# Patient Record
Sex: MALE | Race: BLACK OR AFRICAN AMERICAN | NOT HISPANIC OR LATINO | Employment: FULL TIME | URBAN - METROPOLITAN AREA
[De-identification: names, ages, dates, MRNs, and addresses within clinical notes are randomized per-mention and may not be internally consistent; named-entity substitution may affect disease eponyms.]

---

## 2017-01-04 ENCOUNTER — HOSPITAL ENCOUNTER (EMERGENCY)
Facility: CLINIC | Age: 52
Discharge: HOME | End: 2017-01-04
Attending: EMERGENCY MEDICINE | Admitting: EMERGENCY MEDICINE
Payer: COMMERCIAL

## 2017-01-04 VITALS
OXYGEN SATURATION: 99 % | TEMPERATURE: 98 F | BODY MASS INDEX: 37.56 KG/M2 | WEIGHT: 220 LBS | HEIGHT: 64 IN | DIASTOLIC BLOOD PRESSURE: 72 MMHG | HEART RATE: 87 BPM | RESPIRATION RATE: 18 BRPM | SYSTOLIC BLOOD PRESSURE: 119 MMHG

## 2017-01-04 DIAGNOSIS — L03.011 PARONYCHIA OF RIGHT MIDDLE FINGER: ICD-10-CM

## 2017-01-04 DIAGNOSIS — L03.011 CELLULITIS OF FINGER OF RIGHT HAND: ICD-10-CM

## 2017-01-04 PROCEDURE — 10060 I&D ABSCESS SIMPLE/SINGLE: CPT

## 2017-01-04 PROCEDURE — 76882 US LMTD JT/FCL EVL NVASC XTR: CPT

## 2017-01-04 PROCEDURE — 25000132 ZZH RX MED GY IP 250 OP 250 PS 637: Performed by: EMERGENCY MEDICINE

## 2017-01-04 PROCEDURE — 99284 EMERGENCY DEPT VISIT MOD MDM: CPT | Mod: 25

## 2017-01-04 RX ORDER — HYDROCODONE BITARTRATE AND ACETAMINOPHEN 5; 325 MG/1; MG/1
2 TABLET ORAL ONCE
Status: COMPLETED | OUTPATIENT
Start: 2017-01-04 | End: 2017-01-04

## 2017-01-04 RX ORDER — MULTIPLE VITAMINS W/ MINERALS TAB 9MG-400MCG
1 TAB ORAL DAILY
COMMUNITY
End: 2023-04-11

## 2017-01-04 RX ORDER — CEPHALEXIN 500 MG/1
500 CAPSULE ORAL 4 TIMES DAILY
Qty: 28 CAPSULE | Refills: 0 | Status: SHIPPED | OUTPATIENT
Start: 2017-01-04 | End: 2017-01-11

## 2017-01-04 RX ORDER — LIDOCAINE 40 MG/G
CREAM TOPICAL ONCE
Status: DISCONTINUED | OUTPATIENT
Start: 2017-01-04 | End: 2017-01-04 | Stop reason: HOSPADM

## 2017-01-04 RX ORDER — BACITRACIN ZINC 500 [USP'U]/G
OINTMENT TOPICAL
Status: DISCONTINUED
Start: 2017-01-04 | End: 2017-01-04 | Stop reason: HOSPADM

## 2017-01-04 RX ORDER — HYDROCODONE BITARTRATE AND ACETAMINOPHEN 5; 325 MG/1; MG/1
1-2 TABLET ORAL EVERY 4 HOURS PRN
Qty: 6 TABLET | Refills: 0 | Status: SHIPPED | OUTPATIENT
Start: 2017-01-04 | End: 2021-06-24

## 2017-01-04 RX ADMIN — HYDROCODONE BITARTRATE AND ACETAMINOPHEN 2 TABLET: 5; 325 TABLET ORAL at 18:24

## 2017-01-04 NOTE — ED AVS SNAPSHOT
[facility]    [address]    [facility] [address]    Phone:  [phone]    Fax:  [phone]                                       Vaibhav Vaca   MRN: [id number]    Department:  [facility]   Date of Visit:  1/4/2017           Patient Information     Date Of Birth          1965        Your diagnoses for this visit were:     Paronychia of right middle finger     Cellulitis of finger of right hand        You were seen by Jinny Menendez MD.      Follow-up Information     Follow up with Primary care In 2 days.    Why:  For wound re-check        Follow up with [facility].    Specialty:  EMERGENCY MEDICINE    Why:  If symptoms worsen    Contact information:    [address]  [facility] [address]  [phone]        Discharge Instructions       Soak the finger in warm water for 15 min 2x per day for 2 days.        Paronychia of the Finger or Toe  Paronychia is an infection near a fingernail or toenail. It usually occurs when an opening in the cuticle or an ingrown toenail lets bacteria under the skin.  The infection will need to be drained if pus is present. If the infection has been caught early, you may need only antibiotic treatment. Healing will take about 1 to 2 weeks.  Home care  Follow these guidelines when caring for yourself at home:    Clean and soak the toe or finger. Do this twice a day for the first 3 days. To do so:    Soak your foot or hand in a tub of warm water for 5 minutes. Or hold your toe or finger under a faucet of warm running water for 5 minutes.    Clean any crust away with soap and water using a cotton swab.    Put antibiotic ointment on the infected area.    Change the dressing daily or any time it becomes soiled.    If you were given antibiotics, take them as directed until they are all gone.    If your infection is on a toe, wear  comfortable shoes with a lot of toe room. You can also wear open-toed sandals while your toe heals.    You may use acetaminophen or ibuprofen to help with pain, unless another medicine was prescribed. If you have chronic liver or kidney disease, talk with your health care provider before using these medicines. Also talk with your provider if you've had a stomach ulcer or GI bleeding.  Prevention  The following can prevent paronychia:    Trim or push down the skin around the nail (cuticle).    Don't bite your nails.    Don't suck on your thumbs or fingers.  Follow-up care  Follow up with your health care provider, or as advised.  When to seek medical advice  Call your health care provider right away if any of these occur:    Redness, pain, or swelling of the finger or toe gets worse    Red streaks in the skin leading away from the wound    Pus or fluid drain from the nail area    Fever of 100.4 F (38 C) or higher, or as directed by your health care provider    0703-4329 [employer]. [address] [place] [address]. All rights reserved. This information is not intended as a substitute for professional medical care. Always follow your healthcare professional's instructions.          24 Hour Appointment Hotline       To make an appointment at any [facility] clinic, call [phone] ([phone]). If you don't have a family doctor or clinic, we will help you find one. [facility] clinics are conveniently located to serve the needs of you and your family.             Review of your medicines      START taking        Dose / Directions Last dose taken    cephALEXin 500 MG capsule   Commonly known as:  KEFLEX   Dose:  500 mg   Quantity:  28 capsule        Take 1 capsule (500 mg) by mouth 4 times daily for 7 days   Refills:  0        HYDROcodone-acetaminophen 5-325 MG per tablet   Commonly known as:  NORCO   Dose:  1-2 tablet   Quantity:  6 tablet        Take 1-2 tablets by mouth every 4 hours as  needed for moderate to severe pain   Refills:  0          Our records show that you are taking the medicines listed below. If these are incorrect, please call your family doctor or clinic.        Dose / Directions Last dose taken    Multi-vitamin Tabs tablet   Dose:  1 tablet        Take 1 tablet by mouth daily   Refills:  0                Prescriptions were sent or printed at these locations (2 Prescriptions)                   Other Prescriptions                Printed at Department/Unit printer (2 of 2)         cephALEXin (KEFLEX) 500 MG capsule               HYDROcodone-acetaminophen (NORCO) 5-325 MG per tablet                Procedures and tests performed during your visit     POC US SOFT TISSUE      Orders Needing Specimen Collection     None      Pending Results     No orders found from 1/3/2017 to 1/5/2017.            Pending Culture Results     No orders found from 1/3/2017 to 1/5/2017.       Test Results from your hospital stay           1/4/2017  6:21 PM - Jinny Menendez MD      Narrative     PROCEDURE: ED Limited Bedside Ultrasound   PERFORMED BY: Jinny Menendez MD  LOCATION/SITE: right third finger  INDICATION:  Pain and induration  FINDINGS:  Cobblestoning consistent with cellulitis but no fluid  collection  Interpretation: Cobblestoning consistent with cellulitis but no  fluid collection  Ultrasound images were saved to PACS                  Clinical Quality Measure: Blood Pressure Screening     Your blood pressure was checked while you were in the emergency department today. The last reading we obtained was  BP: (!) 129/93 mmHg . Please read the guidelines below about what these numbers mean and what you should do about them.  If your systolic blood pressure (the top number) is less than 120 and your diastolic blood pressure (the bottom number) is less than 80, then your blood pressure is normal. There is nothing more that you need to do about it.  If your systolic blood pressure (the  "top number) is 120-139 or your diastolic blood pressure (the bottom number) is 80-89, your blood pressure may be higher than it should be. You should have your blood pressure rechecked within a year by a primary care provider.  If your systolic blood pressure (the top number) is 140 or greater or your diastolic blood pressure (the bottom number) is 90 or greater, you may have high blood pressure. High blood pressure is treatable, but if left untreated over time it can put you at risk for heart attack, stroke, or kidney failure. You should have your blood pressure rechecked by a primary care provider within the next 4 weeks.  If your provider in the emergency department today gave you specific instructions to follow-up with your doctor or provider even sooner than that, you should follow that instruction and not wait for up to 4 weeks for your follow-up visit.        Thank you for choosing [facility]       Thank you for choosing [facility] for your care. Our goal is always to provide you with excellent care. Hearing back from our patients is one way we can continue to improve our services. Please take a few minutes to complete the written survey that you may receive in the mail after you visit with us. Thank you!        [employer] Information     [employer] lets you send messages to your doctor, view your test results, renew your prescriptions, schedule appointments and more. To sign up, go to [email or web address] . Click on \"Log in\" on the left side of the screen, which will take you to the Welcome page. Then click on \"Sign up Now\" on the right side of the page.     You will be asked to enter the access code listed below, as well as some personal information. Please follow the directions to create your username and password.     Your access code is: [email or web address]  Expires: 2017  6:22 PM     Your access code will  in 90 days. If you need help or a new code, please call your [facility] clinic or [phone].      "   Care EveryWhere ID     This is your Care EveryWhere ID. This could be used by other organizations to access your [facility] medical records  [id number]        After Visit Summary       This is your record. Keep this with you and show to your community pharmacist(s) and doctor(s) at your next visit.

## 2017-01-04 NOTE — ED NOTES
Pt was biting his hangnail off on his right middle finger one week ago. Pt know c/o pain and swelling. No drainage.

## 2017-01-04 NOTE — ED PROVIDER NOTES
"  History     Chief Complaint:  Hand Pain      HPI   Vaibhav Vaca is a 51 year old male with a history of hyperlipidemia who presents to the emergency department for evaluation of right hand pain. The patient states that he has had right 3rd finger pain and swelling for the last week or so following \"biting off a hangnail.\" This increasing swelling and pain was concerning to him and prompted him to seek evaluation here in the emergency department.     Allergies:  NKDA     Medications:    The patient is currently on no regular medications.      Past Medical History:    hyperlipidemia    Past Surgical History:    The patient does not have any pertinent past surgical history  Family / Social History:    No past pertinent family history.    Social History:  Presents with his wife.  Negative for tobacco use.  Negative for alcohol use.  Marital Status:   [2]    Review of Systems   Musculoskeletal:        Positive for right hand pain.    All other systems reviewed and are negative.    Physical Exam     Patient Vitals for the past 24 hrs:   BP Temp Temp src Pulse Resp SpO2 Height Weight   01/04/17 1543 (!) 129/93 mmHg 98  F (36.7  C) Temporal 69 18 96 % 1.626 m (5' 4\") 99.791 kg (220 lb)       Physical Exam  Gen: alert  MSK: tenderness to distal phalynx of right 3rd finger, normal ROM MCP, DIP and PIP, no flexor tendon tenderness, no swelling proximal to the DIP, paronychia, Tenderness to volar aspect of distal phalynx, but no gross fluctuance. Normal range of motion  CV: RUE warm and well perfused with normal cap refill  Skin: right 3rd finger: Obvious paronychia with green pus.   Neuro: alert, appropriate conversation and interaction, normal strength and sensation right hand 3rd digit    Emergency Department Course   Imaging:  Radiographic findings were communicated with the patient who voiced understanding of the findings.    Procedures:  POC ultrasound soft tissue was performed at the bedside  PROCEDURE: ED " Limited Bedside Ultrasound   PERFORMED BY: Jinny Menendez MD  LOCATION/SITE: right third finger  INDICATION:  Pain and induration  FINDINGS:  Cobblestoning consistent with cellulitis but no fluid  collection  Interpretation: Cobblestoning consistent with cellulitis but no  fluid collection    Procedure;  Effected area cleaned with betadine x 3 then wiped away with alcoholol.  LMX  Used on the area with good anethesia.  Number 11 scapel used to make a incision.  Purlent drainage was removed . No gauze was needed as area was small. Appropraite wound care dressing applied.  Pt tolerated preocedure well.    Emergency Department Course:  Nursing notes and vitals reviewed. I performed an exam of the patient as documented above.     The patient was sent for a POC US soft tissues while in the emergency department. See procedure note as above.    Findings and plan explained to the Patient. Patient discharged home with instructions regarding supportive care, medications, and reasons to return. The importance of close follow-up was reviewed. The patient was prescribed keflex.    I personally reviewed the laboratory results with the Patient and answered all related questions prior to discharge.     Impression & Plan    Medical Decision Making:  Vaibhav Vaca is a 51 year old male who presents with finger pain and swelling. Exam shows paronychia. He did have some tenderness over the pad of the finger, therefore, felon was considered. Therefore, bedside ultrasound was obtained. US shows inflammation in the soft tissues of the finger, but no significant collection of fluid. Will prescribed antibiotics: keflex. Possibility of progressive or developing abscess in the fingertip discussed with the patient. Follow up with primary care in 2 days for recheck.  Return for worsening pain or fevers. Plan to be discharged home with keflex.       Diagnosis:    ICD-10-CM    1. Paronychia of right middle finger L03.011    2. Cellulitis of  finger of right hand L03.011          Discharge Medications:  New Prescriptions    CEPHALEXIN (KEFLEX) 500 MG CAPSULE    Take 1 capsule (500 mg) by mouth 4 times daily for 7 days     IRomy, am serving as a scribe on 1/4/2017 at 5:13 PM to personally document services performed by Jinny Menendez MD based on my observations and the provider's statements to me.       Romy Caruso  1/4/2017   [facility] EMERGENCY DEPARTMENT        Jinny Menendez MD  01/04/17 6844

## 2017-01-04 NOTE — ED AVS SNAPSHOT
[facility] Department    [address]    [facility] [address]    Phone:  [phone]    Fax:  [phone]                                       Vaibhav Vaca   MRN: [id number]    Department:  [facility] Emergency Department   Date of Visit:  1/4/2017           After Visit Summary Signature Page     I have received my discharge instructions, and my questions have been answered. I have discussed any challenges I see with this plan with the nurse or doctor.    ..........................................................................................................................................  Patient/Patient Representative Signature      ..........................................................................................................................................  Patient Representative Print Name and Relationship to Patient    ..................................................               ................................................  Date                                            Time    ..........................................................................................................................................  Reviewed by Signature/Title    ...................................................              ..............................................  Date                                                            Time

## 2017-01-05 NOTE — DISCHARGE INSTRUCTIONS
Soak the finger in warm water for 15 min 2x per day for 2 days.        Paronychia of the Finger or Toe  Paronychia is an infection near a fingernail or toenail. It usually occurs when an opening in the cuticle or an ingrown toenail lets bacteria under the skin.  The infection will need to be drained if pus is present. If the infection has been caught early, you may need only antibiotic treatment. Healing will take about 1 to 2 weeks.  Home care  Follow these guidelines when caring for yourself at home:    Clean and soak the toe or finger. Do this twice a day for the first 3 days. To do so:    Soak your foot or hand in a tub of warm water for 5 minutes. Or hold your toe or finger under a faucet of warm running water for 5 minutes.    Clean any crust away with soap and water using a cotton swab.    Put antibiotic ointment on the infected area.    Change the dressing daily or any time it becomes soiled.    If you were given antibiotics, take them as directed until they are all gone.    If your infection is on a toe, wear comfortable shoes with a lot of toe room. You can also wear open-toed sandals while your toe heals.    You may use acetaminophen or ibuprofen to help with pain, unless another medicine was prescribed. If you have chronic liver or kidney disease, talk with your health care provider before using these medicines. Also talk with your provider if you've had a stomach ulcer or GI bleeding.  Prevention  The following can prevent paronychia:    Trim or push down the skin around the nail (cuticle).    Don't bite your nails.    Don't suck on your thumbs or fingers.  Follow-up care  Follow up with your health care provider, or as advised.  When to seek medical advice  Call your health care provider right away if any of these occur:    Redness, pain, or swelling of the finger or toe gets worse    Red streaks in the skin leading away from the wound    Pus or fluid drain from the nail area    Fever of 100.4 F (38 C)  or higher, or as directed by your health care provider    7202-2643 [employer]. [address] [place] [address], [place] [address]. All rights reserved. This information is not intended as a substitute for professional medical care. Always follow your healthcare professional's instructions.

## 2017-01-05 NOTE — ED NOTES
Pt educated on Norco, pts wife will drive home. D/c instructions reviewed with pt educated on follow-up given prescriptions for Keflex and norco, educated to not drink, drive or operate machinery while taking.

## 2019-03-04 ENCOUNTER — THERAPY VISIT (OUTPATIENT)
Dept: PHYSICAL THERAPY | Facility: CLINIC | Age: 54
End: 2019-03-04
Payer: COMMERCIAL

## 2019-03-04 DIAGNOSIS — M72.2 PLANTAR FASCIITIS OF LEFT FOOT: ICD-10-CM

## 2019-03-04 DIAGNOSIS — M72.2 PLANTAR FASCIITIS OF RIGHT FOOT: ICD-10-CM

## 2019-03-04 PROCEDURE — 97530 THERAPEUTIC ACTIVITIES: CPT | Mod: GP | Performed by: PHYSICAL THERAPIST

## 2019-03-04 PROCEDURE — 97161 PT EVAL LOW COMPLEX 20 MIN: CPT | Mod: GP | Performed by: PHYSICAL THERAPIST

## 2019-03-04 PROCEDURE — 97110 THERAPEUTIC EXERCISES: CPT | Mod: GP | Performed by: PHYSICAL THERAPIST

## 2019-03-04 NOTE — LETTER
[facility]  [address] [address] [phone]    2019  Re: Vaibhav Vaca   :   1965  MRN:  [id number]   REFERRING PHYSICIAN:   [facility]    Date of Initial Evaluation:  3/4/19  Visits:  Rxs Used: 1  Reason for Referral:     Plantar fasciitis of left foot  Plantar fasciitis of right foot    [facility] Initial Evaluation  Subjective:  Vaibhav Vaca is a 53 year old male with a bilateral feet condition.  Condition occurred with:  Repetition/overuse.    This is a new condition  18.    Patient reports pain:  Medial calcaneal tuberosity and longitudinal arch.  Radiates to:  Lower leg.  Pain is described as sharp and shooting and is constant and reported as 8/10.  Associated symptoms:  Tingling and numbness (metatarsal heads). Pain is worse in the A.M..  Symptoms are exacerbated by weight bearing, walking, ascending stairs and descending stairs and relieved by rest (non-weight bearing).  Since onset symptoms are gradually improving (more intense pain).        General health as reported by patient is good.  Pertinent medical history includes:  Overweight.  Medical allergies: no.  Other surgeries include:  None reported.  Current medications:  None as reported by patient.  Current occupation is [employer].  Patient is working in normal job without restrictions.    Barriers include:  None as reported by patient.                  Objective:  Gait: Patient demonstrates bilateral antalgic gait with early heel rise and overpronation.  Observation:  Swelling/warmth Absent  Double leg stance- low arch height and subtalar valgus  Single leg stance- low arch height and subtalar valgus  Double leg WB dorsiflexion- right early heel rise (R = 5 in, L = 6.75 in)  Palpation: TTP along medial calcaneal tuberosity, medial longitudinal arch  Joint mobility:  Subtalar- sup/pron  equal  Calcaneus- eversion/inversion equal  Midtarsals- eversion/inversion, flex/ext, sup/pron equal    Ankle ROM:   Left Right   Dorsiflexion NA deg NA deg   Plantar flexion NA deg NA deg   Inversion NA deg NA deg   Eversion NA deg NA deg         March 4, 2019  Re: Vaibhav Vaca     Ankle Strength:   Left Right Pain?   Inversion + DF 5/5 5/5 none   Inversion + PF 5/5 5/5 none   Eversion + DF 5/5 5/5 none   Eversion + PF 5/5 5/5 bilateral     Special Tests:  Anterior drawer: not done  Talar Tilt: not done  External Rotation: not done    Assessment/Plan:    Patient is a 53 year old male with bilateral fott complaints.    Patient has the following significant findings with corresponding treatment plan.                Diagnosis 1:  Bilateral foot pain consistent with plantar fasciitis  Pain -  hot/cold therapy, US, electric stimulation, manual therapy, splint/taping/bracing/orthotics, self management, education and home program  Decreased strength - therapeutic exercise, therapeutic activities and home program  Impaired balance - neuro re-education, therapeutic activities and home program  Decreased proprioception - neuro re-education, therapeutic activities and home program    Therapy Evaluation Codes:   1) History comprised of:   Personal factors that impact the plan of care:      Profession.    Comorbidity factors that impact the plan of care are:      Overweight.     Medications impacting care: None.  2) Examination of Body Systems comprised of:   Body structures and functions that impact the plan of care:      Foot.   Activity limitations that impact the plan of care are:      Walking.  3) Clinical presentation characteristics are:   Stable/Uncomplicated.  4) Decision-Making    Low complexity using standardized patient assessment instrument and/or measureable assessment of functional outcome.  Cumulative Therapy Evaluation is: Low complexity.  Previous and current functional limitations:  (See Goal Flow Sheet  for this information)    Short term and Long term goals: (See Goal Flow Sheet for this information)   Communication ability:  Patient appears to be able to clearly communicate and understand verbal and written communication and follow directions correctly.  Treatment Explanation - The following has been discussed with the patient:   RX ordered/plan of care  Anticipated outcomes  Possible risks and side effects  This patient would benefit from PT intervention to resume normal activities.   Rehab potential is good.  Frequency:  1 X week, once daily  Duration:  for 8 weeks  Discharge Plan:  Achieve all LTG.  Independent in home treatment program.  Reach maximal therapeutic benefit.  March 4, 2019  Re: Vaibhav Vaca     Please refer to the daily flowsheet for treatment today, total treatment time and time spent performing 1:1 timed codes.     Thank you for your referral.    INQUIRIES  Therapist: Dori Shrestha DPT  [facility] PHYSICAL THERAPY [address] [address]  Phone: [phone]

## 2019-03-04 NOTE — PROGRESS NOTES
[facility] Initial Evaluation  Subjective:    Vaibhav Vaca is a 53 year old male with a bilateral feet condition.  Condition occurred with:  Repetition/overuse.    This is a new condition  12/19/18.    Patient reports pain:  Medial calcaneal tuberosity and longitudinal arch.  Radiates to:  Lower leg.  Pain is described as sharp and shooting and is constant and reported as 8/10.  Associated symptoms:  Tingling and numbness (metatarsal heads). Pain is worse in the A.M..  Symptoms are exacerbated by weight bearing, walking, ascending stairs and descending stairs and relieved by rest (non-weight bearing).  Since onset symptoms are gradually improving (more intense pain).        General health as reported by patient is good.  Pertinent medical history includes:  Overweight.  Medical allergies: no.  Other surgeries include:  None reported.  Current medications:  None as reported by patient.  Current occupation is Bus maintanence.  Patient is working in normal job without restrictions.      Barriers include:  None as reported by patient.                            Objective:  System    Physical Exam    General     ROS  Gait: Patient demonstrates bilateral antalgic gait with early heel rise and overpronation.  Observation:  Swelling/warmth Absent  Double leg stance- low arch height and subtalar valgus  Single leg stance- low arch height and subtalar valgus  Double leg WB dorsiflexion- right early heel rise (R = 5 in, L = 6.75 in)    Palpation: TTP along medial calcaneal tuberosity, medial longitudinal arch    Joint mobility:  Subtalar- sup/pron equal  Calcaneus- eversion/inversion equal  Midtarsals- eversion/inversion, flex/ext, sup/pron equal    Ankle ROM:   Left Right   Dorsiflexion NA deg NA deg   Plantar flexion NA deg NA deg   Inversion NA deg NA deg   Eversion NA deg NA deg     Ankle Strength:   Left Right Pain?   Inversion + DF 5/5 5/5 none   Inversion + PF 5/5 5/5 none   Eversion + DF 5/5 5/5  none   Eversion + PF 5/5 5/5 bilateral     Special Tests:  Anterior drawer: not done  Talar Tilt: not done  External Rotation: not done    Assessment/Plan:    Patient is a 53 year old male with bilateral fott complaints.    Patient has the following significant findings with corresponding treatment plan.                Diagnosis 1:  Bilateral foot pain consistent with plantar fasciitis  Pain -  hot/cold therapy, US, electric stimulation, manual therapy, splint/taping/bracing/orthotics, self management, education and home program  Decreased strength - therapeutic exercise, therapeutic activities and home program  Impaired balance - neuro re-education, therapeutic activities and home program  Decreased proprioception - neuro re-education, therapeutic activities and home program    Therapy Evaluation Codes:   1) History comprised of:   Personal factors that impact the plan of care:      Profession.    Comorbidity factors that impact the plan of care are:      Overweight.     Medications impacting care: None.  2) Examination of Body Systems comprised of:   Body structures and functions that impact the plan of care:      Foot.   Activity limitations that impact the plan of care are:      Walking.  3) Clinical presentation characteristics are:   Stable/Uncomplicated.  4) Decision-Making    Low complexity using standardized patient assessment instrument and/or measureable assessment of functional outcome.  Cumulative Therapy Evaluation is: Low complexity.    Previous and current functional limitations:  (See Goal Flow Sheet for this information)    Short term and Long term goals: (See Goal Flow Sheet for this information)     Communication ability:  Patient appears to be able to clearly communicate and understand verbal and written communication and follow directions correctly.  Treatment Explanation - The following has been discussed with the patient:   RX ordered/plan of care  Anticipated outcomes  Possible risks and  side effects  This patient would benefit from PT intervention to resume normal activities.   Rehab potential is good.    Frequency:  1 X week, once daily  Duration:  for 8 weeks  Discharge Plan:  Achieve all LTG.  Independent in home treatment program.  Reach maximal therapeutic benefit.    Please refer to the daily flowsheet for treatment today, total treatment time and time spent performing 1:1 timed codes.

## 2019-07-22 PROBLEM — M72.2 PLANTAR FASCIITIS OF LEFT FOOT: Status: RESOLVED | Noted: 2019-03-04 | Resolved: 2019-07-22

## 2019-07-22 PROBLEM — M72.2 PLANTAR FASCIITIS OF RIGHT FOOT: Status: RESOLVED | Noted: 2019-03-04 | Resolved: 2019-07-22

## 2020-12-08 ENCOUNTER — TRANSFERRED RECORDS (OUTPATIENT)
Dept: HEALTH INFORMATION MANAGEMENT | Facility: CLINIC | Age: 55
End: 2020-12-08

## 2021-02-02 ENCOUNTER — TRANSFERRED RECORDS (OUTPATIENT)
Dept: HEALTH INFORMATION MANAGEMENT | Facility: CLINIC | Age: 56
End: 2021-02-02

## 2021-02-02 LAB
ALT SERPL-CCNC: 34 IU/L (ref 12–68)
AST SERPL-CCNC: 17 IU/L (ref 12–37)
CHOLEST SERPL-MCNC: 206 MG/DL
CREAT SERPL-MCNC: 1.25 MG/DL (ref 0.7–1.3)
GFR SERPL CREATININE-BSD FRML MDRD: >60 ML/MIN
GLUCOSE SERPL-MCNC: 93 MG/DL (ref 74–106)
HBA1C MFR BLD: 5.6 % (ref 0–5.7)
HDLC SERPL-MCNC: 60 MG/DL
LDLC SERPL CALC-MCNC: 119 MG/DL
POTASSIUM SERPL-SCNC: 4.3 MMOL/L (ref 3.5–5.1)
TRIGL SERPL-MCNC: 133 MG/DL

## 2021-03-15 ENCOUNTER — TRANSFERRED RECORDS (OUTPATIENT)
Dept: HEALTH INFORMATION MANAGEMENT | Facility: CLINIC | Age: 56
End: 2021-03-15

## 2021-03-17 ENCOUNTER — MEDICAL CORRESPONDENCE (OUTPATIENT)
Dept: HEALTH INFORMATION MANAGEMENT | Facility: CLINIC | Age: 56
End: 2021-03-17

## 2021-04-19 ENCOUNTER — TRANSFERRED RECORDS (OUTPATIENT)
Dept: HEALTH INFORMATION MANAGEMENT | Facility: CLINIC | Age: 56
End: 2021-04-19

## 2021-05-04 ENCOUNTER — TRANSFERRED RECORDS (OUTPATIENT)
Dept: HEALTH INFORMATION MANAGEMENT | Facility: CLINIC | Age: 56
End: 2021-05-04

## 2021-06-04 ENCOUNTER — TELEPHONE (OUTPATIENT)
Dept: SURGERY | Facility: CLINIC | Age: 56
End: 2021-06-04

## 2021-06-04 NOTE — TELEPHONE ENCOUNTER
Good Morning Sallie!     I have a patient here that was going to [facility] down in [place], [place] for Bariatric surgery. Pt's wife stated that her  completed all of the provider visits, RD visits, psych consults, etc... and had a surgery date of June 18th but when they submitted the PA, ins came back and said that the clinic/surgeon is out of network and gave our number. Pt wife would like a call as soon as possible to discuss what would be the next step. I sent a My[facility] activation link and gave website link to watch videos if need be.     Best number for pt is: [phone]    Number for Wife is: [phone]

## 2021-06-07 NOTE — TELEPHONE ENCOUNTER
I called the patient's wife back spoke with her and the patient. I explained that the patient will still have to do our process and that our certification may be different than the clinic in [employer]. So we may have different criteria to follow. I let patient know that I will need all of his bariatric ecords from [employer] prior to his appointment on 6/24/21. Patient given the fax # and he will work on getting records sent. My also [facility] to [facility] and complete questionnaire prior to appointment.

## 2021-06-13 ENCOUNTER — HEALTH MAINTENANCE LETTER (OUTPATIENT)
Age: 56
End: 2021-06-13

## 2021-06-24 ENCOUNTER — OFFICE VISIT (OUTPATIENT)
Dept: SURGERY | Facility: CLINIC | Age: 56
End: 2021-06-24
Payer: COMMERCIAL

## 2021-06-24 VITALS
DIASTOLIC BLOOD PRESSURE: 90 MMHG | SYSTOLIC BLOOD PRESSURE: 144 MMHG | BODY MASS INDEX: 48.25 KG/M2 | WEIGHT: 282.6 LBS | HEIGHT: 64 IN | OXYGEN SATURATION: 98 % | HEART RATE: 83 BPM

## 2021-06-24 DIAGNOSIS — K21.9 GASTROESOPHAGEAL REFLUX DISEASE WITHOUT ESOPHAGITIS: Primary | ICD-10-CM

## 2021-06-24 DIAGNOSIS — E66.01 MORBID OBESITY (H): Primary | ICD-10-CM

## 2021-06-24 PROCEDURE — 99204 OFFICE O/P NEW MOD 45 MIN: CPT | Performed by: SURGERY

## 2021-06-24 RX ORDER — HYDROCHLOROTHIAZIDE 12.5 MG/1
12.5-25 TABLET ORAL DAILY
Status: ON HOLD | COMMUNITY
Start: 2021-05-06 | End: 2021-07-29

## 2021-06-24 RX ORDER — ATORVASTATIN CALCIUM 10 MG/1
10 TABLET, FILM COATED ORAL
COMMUNITY
Start: 2021-03-02 | End: 2021-07-27

## 2021-06-24 RX ORDER — ERGOCALCIFEROL 1.25 MG/1
CAPSULE, LIQUID FILLED ORAL
COMMUNITY
Start: 2021-02-03 | End: 2021-07-27

## 2021-06-24 RX ORDER — MULTIPLE VITAMINS W/ MINERALS TAB 9MG-400MCG
1 TAB ORAL DAILY
COMMUNITY
End: 2021-06-24

## 2021-06-24 RX ORDER — LISINOPRIL 10 MG/1
TABLET ORAL
COMMUNITY
Start: 2020-10-10 | End: 2021-07-27

## 2021-06-24 ASSESSMENT — MIFFLIN-ST. JEOR: SCORE: 2037.87

## 2021-06-24 NOTE — LETTER
[facility]    [address], [address]  [place], [place] [address]  Phone [phone]  Fax [phone] [address], [address]  [facility] [place] [address]  Phone [phone]  Fax [phone]    [email or web address]     2021    Re: Vaibhav Vaca  : 1965      Dear Zahraa Porras:      I had the pleasure of meeting with your patient Vaibhav Vaca in our weight loss surgery office.  This patient is a 55 year old male who presented as a self-referral to my clinic in hopes of proceeding with revisional bariatric surgery.  He states that he underwent a laparoscopic sleeve gastrectomy in  at [facility].  Prior to this surgery his weight was around 290 pounds.  He suffered from multiple medical comorbidities related to this including obstructive sleep apnea, hypertension, hypercholesterolemia.  After the surgery in approximately the first 6 to 12 months he has lost approximately 60 pounds.  He did at that time see some improvement in his medical comorbid conditions.  Within a year after surgery he had however developed pretty significant acid reflux disease.  He had been evaluated for this including upper GI endoscopy by his report and had been treating this symptomatically.  His surgeon had subsequently transitioned into [facility] and there were other issues and changes with his bariatric clinic which made it challenging for him to continue with follow-up.  In the interval since then he has had ongoing issues with symptomatic acid reflux disease as well as weight regain.  He has seen worsening in his medical comorbid conditions related to obstructive sleep apnea, hypertension, the development of prediabetes.  Is also developed significant problems with plantar fasciitis.  He had undergone a complete process for revisional surgery at a separate practice down in [facility] where he had previously  "lived.  He then found out that this was not covered by insurance and he ultimately presented to our offices in hopes of continuing and completing this process.      At initial evaluation we recorded Vaibhav Vaca's Height: 162.6 cm (5' 4\"),   and weight is 282.6 pounds  .       PREVIOUS WEIGHT LOSS ATTEMPTS:  No flowsheet data found.  Exercise, calorie counting, prior laparoscopic sleeve gastrectomy     CO-MORBIDITIES OF OBESITY INCLUDE:  No flowsheet data found.  Hypertension, hypercholesterolemia, borderline diabetes, obstructive sleep apnea, plantar fasciitis     VITALS:  BP (!) 144/90 (BP Location: Right arm, Patient Position: Sitting, Cuff Size: Adult Regular)   Pulse 83   Ht 1.626 m (5' 4\")   Wt 128.2 kg (282 lb 9.6 oz)   SpO2 98%   BMI 48.51 kg/m       PE:  GENERAL: Alert and oriented x3. NAD  HEENT exam: Sclerae not icteric. Hearing good. Head normocephalic and atraumatic.   CARDIOVASCULAR: No JVD  RESPIRATORY: Breathing unlabored  GASTROINTESTINAL: Obese  LOWER EXTREMITIES: no deformities  MUSCULOSKELETAL: Normal gait, Moves all 4 extremities equal and strong  NEUROLOGIC: no gross defect  SKIN: warm and dry to touch      In summary, he has undergone an appropriate medical evaluation, dietitian evaluation, as well as psychologic screening. The patient appears to be an appropriate candidate for bariatric surgery.     In the office today, I discussed the Robotic assisted laparoscopic revision to Emmie-en-Y gastric bypass surgery.  Risks, benefits and anticipated outcomes were outlined including the risk of death, staple line leak (1-2%), PE, DVT, ulcer, worsening GERD, N/V, stricture, hernia, wound infection, weight regain, and vitamin deficiencies. This patient has a good chance of sustaining permanent weight loss due to this procedure.  This should also allow improvement if not resolution of his/her weight related medical conditions.     At present we are going to present your patient's file for prior " authorization to insurance.  He is also been to meet with one of my dietitians as well as physicians assistants.  I am going to also send him for an upper GI swallow study to assess his anatomy.  After this is complete and pending prior authorization, I anticipate a surgical date in the near future.  We will keep you updated on any progress.  If you have questions regarding care please feel free to contact me.             Sincerely,     Michel Shepherd MD

## 2021-06-24 NOTE — PROGRESS NOTES
Dear Zahraa Porras,      Referring provider: No flowsheet data found.  I was asked to see the patient regarding obesity by the referring provider above.    I had the pleasure of meeting with your patient Vaibhav Vaca in our weight loss surgery office.  This patient is a 55 year old male who presented as a self-referral to my clinic in hopes of proceeding with revisional bariatric surgery.  He states that he underwent a laparoscopic sleeve gastrectomy in 2015 at [facility].  Prior to this surgery his weight was around 290 pounds.  He suffered from multiple medical comorbidities related to this including obstructive sleep apnea, hypertension, hypercholesterolemia.  After the surgery in approximately the first 6 to 12 months he has lost approximately 60 pounds.  He did at that time see some improvement in his medical comorbid conditions.  Within a year after surgery he had however developed pretty significant acid reflux disease.  He had been evaluated for this including upper GI endoscopy by his report and had been treating this symptomatically.  His surgeon had subsequently transitioned into [facility] and there were other issues and changes with his bariatric clinic which made it challenging for him to continue with follow-up.  In the interval since then he has had ongoing issues with symptomatic acid reflux disease as well as weight regain.  He has seen worsening in his medical comorbid conditions related to obstructive sleep apnea, hypertension, the development of prediabetes.  Is also developed significant problems with plantar fasciitis.  He had undergone a complete process for revisional surgery at a separate practice down in [facility] where he had previously lived.  He then found out that this was not covered by insurance and he ultimately presented to our offices in hopes of continuing and completing this process.     At initial evaluation we recorded Vaibhav Vaca's Height: 162.6 cm  "(5' 4\"),   and weight is 282.6 pounds  .      PREVIOUS WEIGHT LOSS ATTEMPTS:  No flowsheet data found.  Exercise, calorie counting, prior laparoscopic sleeve gastrectomy    CO-MORBIDITIES OF OBESITY INCLUDE:  No flowsheet data found.  Hypertension, hypercholesterolemia, borderline diabetes, obstructive sleep apnea, plantar fasciitis    VITALS:  BP (!) 144/90 (BP Location: Right arm, Patient Position: Sitting, Cuff Size: Adult Regular)   Pulse 83   Ht 1.626 m (5' 4\")   Wt 128.2 kg (282 lb 9.6 oz)   SpO2 98%   BMI 48.51 kg/m      PE:  GENERAL: Alert and oriented x3. NAD  HEENT exam: Sclerae not icteric. Hearing good. Head normocephalic and atraumatic.   CARDIOVASCULAR: No JVD  RESPIRATORY: Breathing unlabored  GASTROINTESTINAL: Obese  LOWER EXTREMITIES: no deformities  MUSCULOSKELETAL: Normal gait, Moves all 4 extremities equal and strong  NEUROLOGIC: no gross defect  SKIN: warm and dry to touch     In summary, he has undergone an appropriate medical evaluation, dietitian evaluation, as well as psychologic screening. The patient appears to be an appropriate candidate for bariatric surgery.    In the office today, I discussed the Robotic assisted laparoscopic revision to Emmie-en-Y gastric bypass surgery.  Risks, benefits and anticipated outcomes were outlined including the risk of death, staple line leak (1-2%), PE, DVT, ulcer, worsening GERD, N/V, stricture, hernia, wound infection, weight regain, and vitamin deficiencies. This patient has a good chance of sustaining permanent weight loss due to this procedure.  This should also allow improvement if not resolution of his/her weight related medical conditions.    At present we are going to present your patient's file for prior authorization to insurance.  He is also been to meet with one of my dietitians as well as physicians assistants.  I am going to also send him for an upper GI swallow study to assess his anatomy.  After this is complete and pending prior " authorization, I anticipate a surgical date in the near future.  We will keep you updated on any progress.  If you have questions regarding care please feel free to contact me.          Sincerely,    Michel Shepherd MD    Please route or send letter to:  Primary Care Provider (PCP) and Referring Provider

## 2021-06-27 PROBLEM — Z98.84 STATUS POST LAPAROSCOPIC SLEEVE GASTRECTOMY: Status: ACTIVE | Noted: 2017-11-17

## 2021-06-27 PROBLEM — K21.9 GERD (GASTROESOPHAGEAL REFLUX DISEASE): Status: ACTIVE | Noted: 2017-05-15

## 2021-06-27 PROBLEM — Z87.442 HISTORY OF KIDNEY STONES: Status: ACTIVE | Noted: 2017-08-03

## 2021-06-27 NOTE — PROGRESS NOTES
"New Bariatric Surgery Consultation Note    2021      RE: Vaibhav Vaca  MR#: [id number]  : 1965      Chief Complaint/Reason for visit: evaluation for possible weight loss surgery    Dear Kayode, Zahraa BRAGA MD (General),    I had the pleasure of seeing your patient, Vaibhav Vaca, to evaluate his obesity and consider him for possible revisional weight loss surgery. As you know, Vaibhav Vaca is 55 year old.  He has a height of 5' 4\", a weight of 276 lbs 0 oz, and calculated Body mass index is 47.38 kg/m .        Assessment & Plan   Problem List Items Addressed This Visit     Morbid obesity (H) - Primary    Essential hypertension    GERD (gastroesophageal reflux disease)    Hyperlipidemia    CHANTELLE (obstructive sleep apnea)    Vitamin D deficiency    Relevant Orders    Vitamin D Screen    Comprehensive metabolic panel      Other Visit Diagnoses     Malnutrition following gastrointestinal surgery        Relevant Orders    Vitamin D Screen    Comprehensive metabolic panel           70 minutes spent on the date of the encounter doing chart review, history and exam, review test results, counseling, developing plan of care, documentation, and further activities as noted above.       HISTORY OF PRESENT ILLNESS:  Weight Loss History Reviewed with Patient 2021   How long have you been overweight? Since late 20's to early 40's   What is the most that you have ever weighed? 320   What is the most weight you have lost? 60   I have tried the following methods to lose weight Weight Loss Surgery   I have tried the following weight loss medications? (Check all that apply) None   Have you ever had weight loss surgery? Yes   Please select the type of weight loss surgery you had (select all that apply): sleeve gastrectomy     Pt underwent laparoscopic sleeve gastrectomy in 2015 at [facility].   Within a year after surgery developed significant acid reflux disease.   His surgeon retired and additional " factors made it challenging for him to continue with follow-up. Since, he has had ongoing issues with symptomatic acid reflux disease as well as weight regain.  He had undergone a complete process for revisional surgery in [facility] where he had previously lived.  His insurance does not cover surgery there. He presented to our office in hopes of continuing and completing this process. He had a consult with Dr. Shepherd on 6/24/21 and is ok to proceed  with revision to a gastric bypass surgery.     Prior to this surgery his weight was around 290 pounds.  After the surgery in the first year he has lost approximately 60 pounds.  Felt he would have done better if he would have had a comprehensive after care program.  His wife is pregnant with their 4th child.  She is due in 6 weeks.  Is hoping to get the surgery before then if possible.     CO-MORBIDITIES OF OBESITY INCLUDE:     6/24/2021   I have the following health issues associated with obesity: Pre-Diabetes, High Blood Pressure, High Cholesterol, Sleep Apnea, GERD (Reflux), Plantar Fasciitis   Uses CPAP nightly.  Recently restarted HTN medications this week after being off for about a month.       PAST MEDICAL HISTORY:  Past Medical History:   Diagnosis Date     Essential hypertension 9/17/2014     GERD (gastroesophageal reflux disease) 5/15/2017     History of kidney stones 8/3/2017     Hypercholesteremia      CHANTELLE (obstructive sleep apnea) 3/10/2015    Formatting of this note might be different from the original. Recent dx, has home cpap Formatting of this note might be different from the original. Recent dx, has home cpap  Formatting of this note might be different from the original. 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 Yes   Who do you have in your support network that can be available to help you for the first 2 weeks after surgery? Wife, daughter, family.   Who can you count on for support throughout your weight loss surgery journey? Wife, daughter, family.   Can you afford 3 meals a day?  Yes   Can you afford 50-60 dollars a month for vitamins? Yes       HABITS:     6/24/2021   How often do you drink alcohol? Never   Have you ever used any of the following nicotine products? Cigarettes   If you previously used any of these products, what year did you quit? 30   Have you or are you currently using street drugs or prescription strength medication for which you do not have a prescription for? No       PSYCHOLOGICAL HISTORY:   Psychological History Reviewed With Patient 6/24/2021   Have you ever attempted suicide? Never.   Have you had thoughts of suicide in the past year? No   Have you ever been hospitalized for mental illness or a suicide attempt? Never.   Do you have a history of chronic pain? No   Have you ever been diagnosed with fibromyalgia? No   Are you currently seeing a therapist or counselor?  No   Are you currently seeing a psychiatrist? No       ROS:     6/24/2021   Skin:  None of the above   HEENT: None of these   Musculoskeletal: Joint Pain, Back pain, Swelling of legs   Cardiovascular: None of the above   Pulmonary: Snoring   Gastrointestinal: Heartburn, Reflux   Genitourinary: Kidney stones   Hematological: None of the above   Neurological: None of the above       EATING BEHAVIORS:     6/24/2021   Have you or anyone else thought that you had an eating disorder?  No   Do you currently binge eat (eat a large amount of food in a short time)? No   Are you an emotional eater? No   Do you get up to eat after falling asleep? No       EXERCISE:     6/24/2021   How often do you exercise? 3 to 4 times per week   What is the duration of your exercise (in minutes)? 30 Minutes   What types of exercise do you do? walking   What keeps you from being more active?  Pain, Shortness of breath, Too tired       MEDICATIONS:  Current Outpatient Medications   Medication     atorvastatin (LIPITOR) 10 MG tablet     CALCIUM CITRATE PO     Cyanocobalamin (VITAMIN B12) 1000 MCG TBCR     hydrochlorothiazide (HYDRODIURIL) 12.5 MG tablet     lisinopril (ZESTRIL) 10 MG tablet     multivitamin, therapeutic with minerals (MULTI-VITAMIN) TABS tablet     vitamin D2 (ERGOCALCIFEROL) 28901 units (1250 mcg) capsule     No current facility-administered medications for this visit.         ALLERGIES:  No Known Allergies       BP Readings from Last 6 Encounters:   06/28/21 138/86   06/24/21 (!) 144/90   01/04/17 119/72   03/18/15 (!) 138/95         LABS AND RECORDS REVIEWED:  3/17/2021 Dr. Rachid Funes- Psych Report  5/4/2021 Diet Notes [facility]  3/9/2021 [facility] Bariatric Pre-Op Visit    4/19/2021 Pre-op [facility]  2/2/21 Labs [facility]   CBC  CMP  HgA1C    Hemoglobin A1C   Date Value Ref Range Status   02/02/2021 5.6 <5.7 % Final     Potassium   Date Value Ref Range Status   02/02/2021 4.3 3.5 - 5.1 mmol/L Final     Glucose   Date Value Ref Range Status   02/02/2021 93 74 - 106 mg/dL Final     Creatinine   Date Value Ref Range Status   02/02/2021 1.25 0.70 - 1.30 mg/dL Final     GFR Estimate   Date Value Ref Range Status   02/02/2021 >60 >60 mL/min Final     ALT   Date Value Ref Range Status   02/02/2021 34 12 - 68 IU/L Final     AST   Date Value Ref Range Status   02/02/2021 17 12 - 37 IU/L Final     Cholesterol   Date Value Ref Range Status   02/02/2021 206 (H) <200 mg/dL Final  "    HDL Cholesterol   Date Value Ref Range Status   02/02/2021 60 >40 mg/dL Final     LDL Cholesterol Calculated   Date Value Ref Range Status   02/02/2021 119 (H) <100 mg/dL Final     Triglycerides   Date Value Ref Range Status   02/02/2021 133 <150 mg/dL Final         PHYSICAL EXAM:  /86   Pulse 80   Ht 5' 4\" (1.626 m)   Wt 276 lb (125.2 kg)   SpO2 97%   BMI 47.38 kg/m    GENERAL: Healthy, alert and no distress  EYES: Eyes grossly normal to inspection.  No discharge or erythema, or obvious scleral/conjunctival abnormalities.  RESP: No audible wheeze, cough, or visible cyanosis.  No visible retractions or increased work of breathing.    SKIN: Visible skin clear. No significant rash, abnormal pigmentation or lesions.  NEURO: Cranial nerves grossly intact.  Mentation and speech appropriate for age.  PSYCH: Mentation appears normal, affect normal/bright, judgement and insight intact, normal speech and appearance well-groomed.      In summary, Vaibhav Vaca has Class III obesity with a body mass index of Body mass index is 47.38 kg/m . kg/m2 and the comorbidities stated above. Pt is s/p laparoscopic sleeve gastrectomy with significant GERD and weight regain.  He had undergone a complete process for revisional surgery in [facility]. Due to his insurance he is transferring to our program to complete the process. He had a consult with Dr. Shepherd on 6/24/21 and is ok to proceed  with revision to a gastric bypass surgery..  He has the following prerequisites to complete:     Received weight loss goal:  maintain wt prior to surgery.- PT has already lost 12 lbs since starting process in [place]  Achieve clearance from dietitian to see surgeon.- clearance given today.  Additional records from dietician in media tab  Have preoperative laboratory tests drawn.- Reviewed, Needs Vitamin D repeated  Psychological Evaluation with MMPI and clearance for weight loss surgery.- completed, in media tab  UGI- scheduled " for tomorrow  Recheck BP in 2 weeks    Today in the office we discussed the gastric bypass surgery. Preoperative, perioperative, and postoperative processes, management, and follow up were addressed.  Risks and benefits were reviewed with Dr. Shepherd at his surgeon consult.   I emphasized exercise and activity along with appropriate food choice as the main foundation for weight loss with surgery providing surgical reinforcement of this. Patient verbalizes understanding of the process to surgery and the post operative schedule.  All questions were answered.    A goal sheet and support group handout were given to the patient.     Vaibhav will have his UGI tomorrow and get his vitamin D drawn.  He states [facility] has already approved him for surgery so an additional prior auth in not needed.   I have sent a note to our  to confirm and contact the patient about next steps.      Sincerely,     Kavita Ferguson PA-C

## 2021-06-28 ENCOUNTER — TRANSFERRED RECORDS (OUTPATIENT)
Dept: HEALTH INFORMATION MANAGEMENT | Facility: CLINIC | Age: 56
End: 2021-06-28

## 2021-06-28 ENCOUNTER — OFFICE VISIT (OUTPATIENT)
Dept: SURGERY | Facility: CLINIC | Age: 56
End: 2021-06-28
Payer: COMMERCIAL

## 2021-06-28 ENCOUNTER — VIRTUAL VISIT (OUTPATIENT)
Dept: SURGERY | Facility: CLINIC | Age: 56
End: 2021-06-28
Payer: COMMERCIAL

## 2021-06-28 VITALS
WEIGHT: 276 LBS | OXYGEN SATURATION: 97 % | BODY MASS INDEX: 47.12 KG/M2 | HEIGHT: 64 IN | SYSTOLIC BLOOD PRESSURE: 138 MMHG | DIASTOLIC BLOOD PRESSURE: 86 MMHG | HEART RATE: 80 BPM

## 2021-06-28 DIAGNOSIS — G47.33 OSA (OBSTRUCTIVE SLEEP APNEA): ICD-10-CM

## 2021-06-28 DIAGNOSIS — E78.5 HYPERLIPIDEMIA, UNSPECIFIED HYPERLIPIDEMIA TYPE: ICD-10-CM

## 2021-06-28 DIAGNOSIS — K21.9 GASTROESOPHAGEAL REFLUX DISEASE, UNSPECIFIED WHETHER ESOPHAGITIS PRESENT: ICD-10-CM

## 2021-06-28 DIAGNOSIS — E55.9 VITAMIN D DEFICIENCY: ICD-10-CM

## 2021-06-28 DIAGNOSIS — I10 ESSENTIAL HYPERTENSION: ICD-10-CM

## 2021-06-28 DIAGNOSIS — E66.01 MORBID OBESITY (H): ICD-10-CM

## 2021-06-28 DIAGNOSIS — E66.01 MORBID OBESITY (H): Primary | ICD-10-CM

## 2021-06-28 DIAGNOSIS — K91.2 MALNUTRITION FOLLOWING GASTROINTESTINAL SURGERY: ICD-10-CM

## 2021-06-28 PROCEDURE — 99417 PROLNG OP E/M EACH 15 MIN: CPT | Performed by: PHYSICIAN ASSISTANT

## 2021-06-28 PROCEDURE — 97802 MEDICAL NUTRITION INDIV IN: CPT | Mod: GT | Performed by: DIETITIAN, REGISTERED

## 2021-06-28 PROCEDURE — 99215 OFFICE O/P EST HI 40 MIN: CPT | Performed by: PHYSICIAN ASSISTANT

## 2021-06-28 ASSESSMENT — MIFFLIN-ST. JEOR: SCORE: 2007.93

## 2021-06-28 NOTE — Clinical Note
I saw Vaibhav,    Reviewed his records.  He is good to go on my end.  Will get vit D drawn since it was low.  BP elevated.  Restarted BP meds.  Will recheck BP in 2 wks.  Diet cleared him.  SHANDAI scheduled for tomorrow. Jimmie is in [place] now to get him scheduled for surgery/ confirm insurance prior auth good for us.    CHRIS

## 2021-06-28 NOTE — PATIENT INSTRUCTIONS
Abdullahi Esposito-  Manecome to the [facility], [place]! It was great to visit with you and learn about your progress. Below are the goals we discussed.  GOALS:  Increase multivitamin/ mineral to 2 tablets per day (verify that is has 18 mg iron/ tablet)  Continue to practice all pre-op bariatric behavior changes  Verify amount of calcium taking (should be 600 mg 2X per day or 500 mg 3X per day)/ bring all vitamin bottle to next visit in the clinic    Nutrition Educational Materials:    Diet Guidelines after Weight-loss Surgery  [email or web address]     Your Stage 1 Diet: Clear Liquids  [email or web address]     Your Stage 2 Diet: Low-fat Full Liquids  [email or web address]     Your Stage 3 Diet: Pureed Foods  [email or web address]     Pureed Pleasures  [email or web address]    Your Stage 4 Diet: Soft Foods  [email or web address]    Your Stage 5 Diet: Regular Foods  [email or web address]    Supplements after Weight Loss Surgery  [email or web address]       After reviewing your chart further and discussing your history with Kavita Ferguson PA-C I do not feel we need another follow up visit prior to surgery.    Thanks!  Gonzalo Morales, NANCY, LD  [facility] [place]

## 2021-06-28 NOTE — PROGRESS NOTES
"Video-Visit Details    Type of service:  Video Visit    Video Start Time (time video started): 8:33    Video End Time (time video stopped): 9:17    Originating Location (pt. Location): Other car    Distant Location (provider location):  [facility] [place]     Mode of Communication:  [facility] Conference [facility]      New Bariatric Nutrition Consultation Note    Reason For Visit: Nutrition Assessment    Vaibhav Vaca is a 55 year old presenting today for new bariatric nutrition consult.  Pt is interested in laparoscopic revision of gastric sleeve to gastric bypass.  Patient is accompanied by self.    Support System Reviewed With Patient 6/24/2021   Who do you have in your support network that can be available to help you for the first 2 weeks after surgery? Wife, daughter, family.   Who can you count on for support throughout your weight loss surgery journey? Wife, daughter, family.       ANTHROPOMETRICS:  Estimated body mass index is 48.51 kg/m  as calculated from the following:    Height as of 6/24/21: 1.626 m (5' 4\").    Weight as of 6/24/21: 128.2 kg (282 lb 9.6 oz).   Weight: 276 lb in clinic with provider  BMI for 6/28/21: 47.38 kg/m2     Required weight loss goal pre-op: 5 lbs from initial consult weight (goal weight 277.6 lbs or less before surgery)       6/24/2021   I have tried the following methods to lose weight Weight Loss Surgery       Weight Loss Questions Reviewed With Patient 6/24/2021   How long have you been overweight? Since late 20's to early 40's       SUPPLEMENT INFORMATION:  1 multivitamin/mineral  1 (not sure of dose/ for over 50) Vitamin D  1 (not sure of dose) calcium  1 (not sure of dose) vitamin B-12      NUTRITION HISTORY:  Recall Diet Questions Reviewed With Patient 6/24/2021   Describe what you typically consume for breakfast (typical or most recent): 2 Eggs, 1/2 slice toast, 1 cup cottage cheese or 1 cup fruit, some days lozano or sausage "   Describe what you typically consume for lunch (typical or most recent): Salad and a protein (tuna) or protein drink, fruit cup   Describe what you typically consume for supper (typical or most recent): Beans, steak, vegetables, rice   Describe what you typically consume as snacks (typical or most recent): Protein bars and shakes   How many ounces of water, or other low calorie drinks, do you drink daily (8 oz=1 glass)? 64 oz or more   How often do you drink alcohol? Never       Eating Habits 6/24/2021   Do you have any dietary restrictions? No   Do you currently binge eat (eat a large amount of food in a short time)? No   Are you an emotional eater? No   Do you get up to eat after falling asleep? No       ADDITIONAL INFORMATION:  Patient had gastric bypass in 2015 at [facility] by Dr. Bowen. Acid reflux became worse with sleeve gastrectomy. preop weight was 299-301 lb and lowest post-op weight: 270 lb. Patient said he was offered a revision after, but he did not want it at that time. Patient started the process to have revision in [place], [place], but he found out he did not insurance coverage at this center (was working with RD at this center-see media tab in EPIC).  He avoids all spicy foods due to acid reflux.  Stope No Doze tablets about 2 month ago to stay awake when driving to work. Avoid all other caffeine.  Wife is expecting a baby in September. He is currently doing most of the cooking at home. Patient feels confident with the pre-op behavior changes.    Dining Out History Reviewed With Patient 6/24/2021   How often do you dine out? Never.       Physical Activity Reviewed With Patient 6/24/2021   How often do you exercise? 3 to 4 times per week   What is the duration of your exercise (in minutes)? 30 Minutes   What types of exercise do you do? walking   What keeps you from being more active?  Pain, Shortness of breath, Too tired       NUTRITION DIAGNOSIS:  Obesity r/t long history of self-monitoring  deficit and excessive energy intake aeb BMI >30 kg/m2.    INTERVENTION:  Intervention Provided/Education Provided on post-op diet guidelines, vitamins/minerals essential post-operatively, GI anatomy of bariatric surgeries, ways to help prepare for post-op diet guidelines pre-operatively, portion/calorie-control.  Provided pt with list of goals RD contact information.      Questions Reviewed With Patient 6/24/2021   How ready are you to make changes regarding your weight? Number 1 = Not ready at all to make changes up to 10 = very ready. 10   How confident are you that you can change? 1 = Not confident that you will be successful making changes up to 10 = very confident. 10       Patient Understanding: good  Expected Compliance: fair-good    GOALS:  Increase multivitamin/ mineral to 2 tablets per day (verify that is has 18 mg iron/ tablet)  Continue to practice all pre-op bariatric behavior changes  Verify amount of calcium taking (should be 600 mg 2X per day or 500 mg 3X per day)/ bring all vitamin bottle to next visit in the clinic    Follow-Up:   Recommend 0 follow up visits to assist with lifestyle changes or per insurance.  Patient is cleared to see a surgeon: yes (done 6/24/21)      Time spent with patient: 43 minutes.  Gonzalo Morales RD, LD  [facility]

## 2021-06-28 NOTE — PATIENT INSTRUCTIONS
Abdullahi Esposito,    Labs have been ordered in the system for you. You can have these drawn at any [place] lab.  Please call [phone] set up an appointment at the lab of your choice.  Lab results will post to [employer] when complete.  Once reviewed, I will write you a note explaining when we find.    Sincerely,    Kavita Ferguson PA-C    Plan:  Clearance from Dietician  Labs drawn  I

## 2021-06-29 ENCOUNTER — HOSPITAL ENCOUNTER (OUTPATIENT)
Dept: GENERAL RADIOLOGY | Facility: CLINIC | Age: 56
Discharge: HOME | End: 2021-06-29
Attending: SURGERY | Admitting: SURGERY
Payer: COMMERCIAL

## 2021-06-29 DIAGNOSIS — E55.9 VITAMIN D DEFICIENCY: ICD-10-CM

## 2021-06-29 DIAGNOSIS — E55.9 VITAMIN D DEFICIENCY: Primary | ICD-10-CM

## 2021-06-29 DIAGNOSIS — K91.2 MALNUTRITION FOLLOWING GASTROINTESTINAL SURGERY: ICD-10-CM

## 2021-06-29 DIAGNOSIS — K21.9 GASTROESOPHAGEAL REFLUX DISEASE WITHOUT ESOPHAGITIS: ICD-10-CM

## 2021-06-29 LAB
ALBUMIN SERPL-MCNC: 3.5 G/DL (ref 3.4–5)
ALP SERPL-CCNC: 93 U/L (ref 40–150)
ALT SERPL W P-5'-P-CCNC: 37 U/L (ref 0–70)
ANION GAP SERPL CALCULATED.3IONS-SCNC: 1 MMOL/L (ref 3–14)
AST SERPL W P-5'-P-CCNC: 31 U/L (ref 0–45)
BILIRUB SERPL-MCNC: 0.4 MG/DL (ref 0.2–1.3)
BUN SERPL-MCNC: 18 MG/DL (ref 7–30)
CALCIUM SERPL-MCNC: 9.7 MG/DL (ref 8.5–10.1)
CHLORIDE SERPL-SCNC: 105 MMOL/L (ref 94–109)
CO2 SERPL-SCNC: 32 MMOL/L (ref 20–32)
CREAT SERPL-MCNC: 1.2 MG/DL (ref 0.66–1.25)
DEPRECATED CALCIDIOL+CALCIFEROL SERPL-MC: 17 UG/L (ref 20–75)
GFR SERPL CREATININE-BSD FRML MDRD: 68 ML/MIN/{1.73_M2}
GLUCOSE SERPL-MCNC: 88 MG/DL (ref 70–99)
POTASSIUM SERPL-SCNC: 4.1 MMOL/L (ref 3.4–5.3)
PROT SERPL-MCNC: 7.9 G/DL (ref 6.8–8.8)
SODIUM SERPL-SCNC: 138 MMOL/L (ref 133–144)

## 2021-06-29 PROCEDURE — 82306 VITAMIN D 25 HYDROXY: CPT | Performed by: PHYSICIAN ASSISTANT

## 2021-06-29 PROCEDURE — 36415 COLL VENOUS BLD VENIPUNCTURE: CPT | Performed by: PHYSICIAN ASSISTANT

## 2021-06-29 PROCEDURE — 80053 COMPREHEN METABOLIC PANEL: CPT | Performed by: PHYSICIAN ASSISTANT

## 2021-06-29 PROCEDURE — 74240 X-RAY XM UPR GI TRC 1CNTRST: CPT

## 2021-07-07 ENCOUNTER — PREP FOR PROCEDURE (OUTPATIENT)
Dept: SURGERY | Facility: CLINIC | Age: 56
End: 2021-07-07

## 2021-07-07 DIAGNOSIS — E66.01 OBESITY, CLASS III, BMI 40-49.9 (MORBID OBESITY) (H): Primary | ICD-10-CM

## 2021-07-07 DIAGNOSIS — K21.9 GASTROESOPHAGEAL REFLUX DISEASE: ICD-10-CM

## 2021-07-08 DIAGNOSIS — Z11.59 ENCOUNTER FOR SCREENING FOR OTHER VIRAL DISEASES: ICD-10-CM

## 2021-07-08 PROBLEM — E66.813 OBESITY, CLASS III, BMI 40-49.9 (MORBID OBESITY) (H): Status: ACTIVE | Noted: 2021-07-08

## 2021-07-08 PROBLEM — E66.01 OBESITY, CLASS III, BMI 40-49.9 (MORBID OBESITY) (H): Status: ACTIVE | Noted: 2021-07-08

## 2021-07-08 PROBLEM — K21.9 GASTROESOPHAGEAL REFLUX DISEASE: Status: ACTIVE | Noted: 2021-07-08

## 2021-07-12 ENCOUNTER — TRANSFERRED RECORDS (OUTPATIENT)
Dept: HEALTH INFORMATION MANAGEMENT | Facility: CLINIC | Age: 56
End: 2021-07-12

## 2021-07-20 ENCOUNTER — TELEPHONE (OUTPATIENT)
Dept: SURGERY | Facility: CLINIC | Age: 56
End: 2021-07-20

## 2021-07-20 NOTE — TELEPHONE ENCOUNTER
RTW letter completed and emailed to patient at email in [facility].  Patient aware that this isn't a secure email.  Lisa Chery, MS, RD, RN

## 2021-07-20 NOTE — LETTER
[facility]          [address] [facility] [place] [id number]                                         Tel:  [phone]  Fax: [phone]    July 20, 2021    Regarding:    Name: Vaibhav Vaca    Address: [address] [address]    YOB: 1965    To Whom it may concern:    Type of Request:  Medical Necessity - employee s own  Serious Health Condition  requiring hospitalization and recovery from a procedure that prohibited patient from performing essential function(s) of his job.        Proceudre will take place:     7/28/2021     [facility]                                                          [address], [place] [address]     Surgeon:  Dr. Michel Shepherd MD     Leave begin:  7/28/21      Leave end date:  8/27/28  Return to work date: 8/28/21     Patient no has restrictions when he returns to work 8/28/21.                                                                      Sincerely,       TAMMY Garcia/zacarias

## 2021-07-23 ENCOUNTER — VIRTUAL VISIT (OUTPATIENT)
Dept: SURGERY | Facility: CLINIC | Age: 56
End: 2021-07-23

## 2021-07-23 DIAGNOSIS — E66.01 OBESITY, CLASS III, BMI 40-49.9 (MORBID OBESITY) (H): Primary | ICD-10-CM

## 2021-07-23 PROCEDURE — 99207 PR NO CHARGE NURSE ONLY: CPT

## 2021-07-23 NOTE — PROGRESS NOTES
Virtual bariatric pre op class completed.  Class power point and patient checklist information gone over with patient.     All questions were answered.  Quiz was completed.     Pt to maintain his weight prior to surgery.    Patient was advised to call if further questions.  Radha Gordon RN on 7/23/2021 at 11:37 AM

## 2021-07-25 ENCOUNTER — LAB (OUTPATIENT)
Dept: URGENT CARE | Facility: URGENT CARE | Age: 56
End: 2021-07-25
Payer: COMMERCIAL

## 2021-07-25 DIAGNOSIS — Z11.59 ENCOUNTER FOR SCREENING FOR OTHER VIRAL DISEASES: ICD-10-CM

## 2021-07-25 PROCEDURE — U0005 INFEC AGEN DETEC AMPLI PROBE: HCPCS

## 2021-07-25 PROCEDURE — U0003 INFECTIOUS AGENT DETECTION BY NUCLEIC ACID (DNA OR RNA); SEVERE ACUTE RESPIRATORY SYNDROME CORONAVIRUS 2 (SARS-COV-2) (CORONAVIRUS DISEASE [COVID-19]), AMPLIFIED PROBE TECHNIQUE, MAKING USE OF HIGH THROUGHPUT TECHNOLOGIES AS DESCRIBED BY CMS-2020-01-R: HCPCS

## 2021-07-26 LAB — SARS-COV-2 RNA RESP QL NAA+PROBE: NEGATIVE

## 2021-07-27 ENCOUNTER — ANESTHESIA EVENT (OUTPATIENT)
Dept: SURGERY | Facility: CLINIC | Age: 56
DRG: 327 | End: 2021-07-27
Payer: COMMERCIAL

## 2021-07-27 NOTE — PROGRESS NOTES
PTA medications updated by Medication Scribe prior to surgery via phone call with patient (last doses completed by Nurse)     Medication history sources: Patient, Surescripts and H&P  In the past week, patient estimated taking medication this percent of the time: Greater than 90%  Adherence assessment: N/A Not Observed    Significant changes made to the medication list:  None      Additional medication history information:   None    Medication reconciliation completed by provider prior to medication history? No    Time spent in this activity: 15 minutes    The information provided in this note is only as accurate as the sources available at the time of update(s)      Prior to Admission medications    Medication Sig Last Dose Taking? Auth Provider   aspirin (ASA) 325 MG EC tablet Take 325 mg by mouth daily as needed for moderate pain (blood thinning) (taken about once per month) > 1 month at prn Yes Reported, Patient   CALCIUM CITRATE PO Take 1 tablet by mouth daily  7/26/2021 at am Yes Reported, Patient   Cholecalciferol (VITAMIN D3 PO) Take 1 tablet by mouth daily 7/26/2021 at am Yes Reported, Patient   Cyanocobalamin (VITAMIN B12) 1000 MCG TBCR Take 1 tablet by mouth daily  7/26/2021 at am Yes Reported, Patient   hydrochlorothiazide (HYDRODIURIL) 12.5 MG tablet Take 12.5-25 mg by mouth daily (pt states MD instructed to take 1 to 2 tablets depending on blood pressure) (Rx states 12.5mg once daily) 7/27/2021 at 1000 Yes Reported, Patient   multivitamin, therapeutic with minerals (MULTI-VITAMIN) TABS tablet Take 1 tablet by mouth daily 7/26/2021 at am Yes Reported, Patient

## 2021-07-28 ENCOUNTER — HOSPITAL ENCOUNTER (INPATIENT)
Facility: CLINIC | Age: 56
LOS: 1 days | Discharge: HOME | DRG: 327 | End: 2021-07-29
Attending: SURGERY | Admitting: SURGERY
Payer: COMMERCIAL

## 2021-07-28 ENCOUNTER — ANESTHESIA (OUTPATIENT)
Dept: SURGERY | Facility: CLINIC | Age: 56
DRG: 327 | End: 2021-07-28
Payer: COMMERCIAL

## 2021-07-28 ENCOUNTER — APPOINTMENT (OUTPATIENT)
Dept: SURGERY | Facility: PHYSICIAN GROUP | Age: 56
End: 2021-07-28
Payer: COMMERCIAL

## 2021-07-28 DIAGNOSIS — Z98.84 BARIATRIC SURGERY STATUS: Primary | ICD-10-CM

## 2021-07-28 DIAGNOSIS — K21.9 GASTROESOPHAGEAL REFLUX DISEASE: ICD-10-CM

## 2021-07-28 DIAGNOSIS — E66.01 OBESITY, CLASS III, BMI 40-49.9 (MORBID OBESITY) (H): ICD-10-CM

## 2021-07-28 DIAGNOSIS — K21.9 GASTROESOPHAGEAL REFLUX DISEASE, UNSPECIFIED WHETHER ESOPHAGITIS PRESENT: ICD-10-CM

## 2021-07-28 LAB
CREAT SERPL-MCNC: 1.25 MG/DL (ref 0.66–1.25)
GFR SERPL CREATININE-BSD FRML MDRD: 65 ML/MIN/1.73M2
HGB BLD-MCNC: 14.5 G/DL (ref 13.3–17.7)
PLATELET # BLD AUTO: 229 10E3/UL (ref 150–450)
POTASSIUM BLD-SCNC: 3.8 MMOL/L (ref 3.4–5.3)

## 2021-07-28 PROCEDURE — 250N000011 HC RX IP 250 OP 636: Performed by: ANESTHESIOLOGY

## 2021-07-28 PROCEDURE — 258N000003 HC RX IP 258 OP 636: Performed by: ANESTHESIOLOGY

## 2021-07-28 PROCEDURE — 36415 COLL VENOUS BLD VENIPUNCTURE: CPT | Performed by: ANESTHESIOLOGY

## 2021-07-28 PROCEDURE — 85018 HEMOGLOBIN: CPT | Performed by: ANESTHESIOLOGY

## 2021-07-28 PROCEDURE — 250N000011 HC RX IP 250 OP 636: Performed by: NURSE ANESTHETIST, CERTIFIED REGISTERED

## 2021-07-28 PROCEDURE — 250N000011 HC RX IP 250 OP 636: Performed by: PHYSICIAN ASSISTANT

## 2021-07-28 PROCEDURE — 36415 COLL VENOUS BLD VENIPUNCTURE: CPT | Performed by: PHYSICIAN ASSISTANT

## 2021-07-28 PROCEDURE — 272N000001 HC OR GENERAL SUPPLY STERILE: Performed by: SURGERY

## 2021-07-28 PROCEDURE — 85049 AUTOMATED PLATELET COUNT: CPT | Performed by: SURGERY

## 2021-07-28 PROCEDURE — 120N000001 HC R&B MED SURG/OB

## 2021-07-28 PROCEDURE — 250N000009 HC RX 250: Performed by: NURSE ANESTHETIST, CERTIFIED REGISTERED

## 2021-07-28 PROCEDURE — 258N000003 HC RX IP 258 OP 636: Performed by: NURSE ANESTHETIST, CERTIFIED REGISTERED

## 2021-07-28 PROCEDURE — 250N000009 HC RX 250: Performed by: SURGERY

## 2021-07-28 PROCEDURE — 43644 LAP GASTRIC BYPASS/ROUX-EN-Y: CPT | Performed by: SURGERY

## 2021-07-28 PROCEDURE — 0DB64Z3 EXCISION OF STOMACH, PERCUTANEOUS ENDOSCOPIC APPROACH, VERTICAL: ICD-10-PCS | Performed by: SURGERY

## 2021-07-28 PROCEDURE — 370N000017 HC ANESTHESIA TECHNICAL FEE, PER MIN: Performed by: SURGERY

## 2021-07-28 PROCEDURE — 43644 LAP GASTRIC BYPASS/ROUX-EN-Y: CPT | Mod: AS | Performed by: PHYSICIAN ASSISTANT

## 2021-07-28 PROCEDURE — 82565 ASSAY OF CREATININE: CPT | Performed by: PHYSICIAN ASSISTANT

## 2021-07-28 PROCEDURE — 0D164ZA BYPASS STOMACH TO JEJUNUM, PERCUTANEOUS ENDOSCOPIC APPROACH: ICD-10-PCS | Performed by: SURGERY

## 2021-07-28 PROCEDURE — 360N000077 HC SURGERY LEVEL 4, PER MIN: Performed by: SURGERY

## 2021-07-28 PROCEDURE — 8E0W4CZ ROBOTIC ASSISTED PROCEDURE OF TRUNK REGION, PERCUTANEOUS ENDOSCOPIC APPROACH: ICD-10-PCS | Performed by: SURGERY

## 2021-07-28 PROCEDURE — 5A09357 ASSISTANCE WITH RESPIRATORY VENTILATION, LESS THAN 24 CONSECUTIVE HOURS, CONTINUOUS POSITIVE AIRWAY PRESSURE: ICD-10-PCS | Performed by: SURGERY

## 2021-07-28 PROCEDURE — 84132 ASSAY OF SERUM POTASSIUM: CPT | Performed by: ANESTHESIOLOGY

## 2021-07-28 PROCEDURE — 250N000011 HC RX IP 250 OP 636: Performed by: SURGERY

## 2021-07-28 PROCEDURE — 250N000025 HC SEVOFLURANE, PER MIN: Performed by: SURGERY

## 2021-07-28 PROCEDURE — 0D1A4ZA BYPASS JEJUNUM TO JEJUNUM, PERCUTANEOUS ENDOSCOPIC APPROACH: ICD-10-PCS | Performed by: SURGERY

## 2021-07-28 PROCEDURE — 258N000001 HC RX 258: Performed by: SURGERY

## 2021-07-28 PROCEDURE — 999N000141 HC STATISTIC PRE-PROCEDURE NURSING ASSESSMENT: Performed by: SURGERY

## 2021-07-28 PROCEDURE — 710N000009 HC RECOVERY PHASE 1, LEVEL 1, PER MIN: Performed by: SURGERY

## 2021-07-28 RX ORDER — DIPHENHYDRAMINE HYDROCHLORIDE 50 MG/ML
25 INJECTION INTRAMUSCULAR; INTRAVENOUS EVERY 6 HOURS PRN
Status: DISCONTINUED | OUTPATIENT
Start: 2021-07-28 | End: 2021-07-29 | Stop reason: HOSPADM

## 2021-07-28 RX ORDER — ONDANSETRON 2 MG/ML
4 INJECTION INTRAMUSCULAR; INTRAVENOUS EVERY 30 MIN PRN
Status: DISCONTINUED | OUTPATIENT
Start: 2021-07-28 | End: 2021-07-28 | Stop reason: HOSPADM

## 2021-07-28 RX ORDER — BUPIVACAINE HYDROCHLORIDE AND EPINEPHRINE 2.5; 5 MG/ML; UG/ML
INJECTION, SOLUTION INFILTRATION; PERINEURAL PRN
Status: DISCONTINUED | OUTPATIENT
Start: 2021-07-28 | End: 2021-07-28 | Stop reason: HOSPADM

## 2021-07-28 RX ORDER — ONDANSETRON 4 MG/1
4 TABLET, ORALLY DISINTEGRATING ORAL EVERY 6 HOURS PRN
Status: DISCONTINUED | OUTPATIENT
Start: 2021-07-28 | End: 2021-07-29 | Stop reason: HOSPADM

## 2021-07-28 RX ORDER — NALOXONE HYDROCHLORIDE 0.4 MG/ML
0.2 INJECTION, SOLUTION INTRAMUSCULAR; INTRAVENOUS; SUBCUTANEOUS
Status: DISCONTINUED | OUTPATIENT
Start: 2021-07-28 | End: 2021-07-29 | Stop reason: HOSPADM

## 2021-07-28 RX ORDER — NALOXONE HYDROCHLORIDE 0.4 MG/ML
0.4 INJECTION, SOLUTION INTRAMUSCULAR; INTRAVENOUS; SUBCUTANEOUS
Status: DISCONTINUED | OUTPATIENT
Start: 2021-07-28 | End: 2021-07-29 | Stop reason: HOSPADM

## 2021-07-28 RX ORDER — SODIUM CHLORIDE, SODIUM LACTATE, POTASSIUM CHLORIDE, CALCIUM CHLORIDE 600; 310; 30; 20 MG/100ML; MG/100ML; MG/100ML; MG/100ML
INJECTION, SOLUTION INTRAVENOUS CONTINUOUS
Status: DISCONTINUED | OUTPATIENT
Start: 2021-07-28 | End: 2021-07-29 | Stop reason: HOSPADM

## 2021-07-28 RX ORDER — PROPOFOL 10 MG/ML
INJECTION, EMULSION INTRAVENOUS PRN
Status: DISCONTINUED | OUTPATIENT
Start: 2021-07-28 | End: 2021-07-28

## 2021-07-28 RX ORDER — FENTANYL CITRATE 0.05 MG/ML
50 INJECTION, SOLUTION INTRAMUSCULAR; INTRAVENOUS EVERY 5 MIN PRN
Status: DISCONTINUED | OUTPATIENT
Start: 2021-07-28 | End: 2021-07-28 | Stop reason: HOSPADM

## 2021-07-28 RX ORDER — NEOSTIGMINE METHYLSULFATE 1 MG/ML
VIAL (ML) INJECTION PRN
Status: DISCONTINUED | OUTPATIENT
Start: 2021-07-28 | End: 2021-07-28

## 2021-07-28 RX ORDER — HYDROMORPHONE HCL IN WATER/PF 6 MG/30 ML
0.2 PATIENT CONTROLLED ANALGESIA SYRINGE INTRAVENOUS EVERY 5 MIN PRN
Status: DISCONTINUED | OUTPATIENT
Start: 2021-07-28 | End: 2021-07-28 | Stop reason: HOSPADM

## 2021-07-28 RX ORDER — LIDOCAINE HYDROCHLORIDE 20 MG/ML
INJECTION, SOLUTION INFILTRATION; PERINEURAL PRN
Status: DISCONTINUED | OUTPATIENT
Start: 2021-07-28 | End: 2021-07-28

## 2021-07-28 RX ORDER — FENTANYL CITRATE 50 UG/ML
INJECTION, SOLUTION INTRAMUSCULAR; INTRAVENOUS PRN
Status: DISCONTINUED | OUTPATIENT
Start: 2021-07-28 | End: 2021-07-28

## 2021-07-28 RX ORDER — LIDOCAINE 40 MG/G
CREAM TOPICAL
Status: DISCONTINUED | OUTPATIENT
Start: 2021-07-28 | End: 2021-07-29 | Stop reason: HOSPADM

## 2021-07-28 RX ORDER — MAGNESIUM HYDROXIDE 1200 MG/15ML
LIQUID ORAL PRN
Status: DISCONTINUED | OUTPATIENT
Start: 2021-07-28 | End: 2021-07-28 | Stop reason: HOSPADM

## 2021-07-28 RX ORDER — ACETAMINOPHEN 325 MG/1
650 TABLET ORAL EVERY 4 HOURS PRN
Status: DISCONTINUED | OUTPATIENT
Start: 2021-07-28 | End: 2021-07-29 | Stop reason: HOSPADM

## 2021-07-28 RX ORDER — INDOCYANINE GREEN AND WATER 25 MG
KIT INJECTION PRN
Status: DISCONTINUED | OUTPATIENT
Start: 2021-07-28 | End: 2021-07-28 | Stop reason: HOSPADM

## 2021-07-28 RX ORDER — ONDANSETRON 4 MG/1
4 TABLET, ORALLY DISINTEGRATING ORAL EVERY 30 MIN PRN
Status: DISCONTINUED | OUTPATIENT
Start: 2021-07-28 | End: 2021-07-28 | Stop reason: HOSPADM

## 2021-07-28 RX ORDER — OXYCODONE HYDROCHLORIDE 5 MG/1
10 TABLET ORAL
Status: DISCONTINUED | OUTPATIENT
Start: 2021-07-28 | End: 2021-07-29 | Stop reason: HOSPADM

## 2021-07-28 RX ORDER — HYDROMORPHONE HYDROCHLORIDE 1 MG/ML
.3-.5 INJECTION, SOLUTION INTRAMUSCULAR; INTRAVENOUS; SUBCUTANEOUS
Status: DISCONTINUED | OUTPATIENT
Start: 2021-07-28 | End: 2021-07-29 | Stop reason: HOSPADM

## 2021-07-28 RX ORDER — EPHEDRINE SULFATE 50 MG/ML
INJECTION, SOLUTION INTRAMUSCULAR; INTRAVENOUS; SUBCUTANEOUS PRN
Status: DISCONTINUED | OUTPATIENT
Start: 2021-07-28 | End: 2021-07-28

## 2021-07-28 RX ORDER — GLYCOPYRROLATE 0.2 MG/ML
INJECTION, SOLUTION INTRAMUSCULAR; INTRAVENOUS PRN
Status: DISCONTINUED | OUTPATIENT
Start: 2021-07-28 | End: 2021-07-28

## 2021-07-28 RX ORDER — ENALAPRILAT 1.25 MG/ML
1.25 INJECTION INTRAVENOUS EVERY 6 HOURS PRN
Status: DISCONTINUED | OUTPATIENT
Start: 2021-07-28 | End: 2021-07-29 | Stop reason: HOSPADM

## 2021-07-28 RX ORDER — HEPARIN SODIUM 5000 [USP'U]/.5ML
5000 INJECTION, SOLUTION INTRAVENOUS; SUBCUTANEOUS
Status: COMPLETED | OUTPATIENT
Start: 2021-07-28 | End: 2021-07-28

## 2021-07-28 RX ORDER — DEXAMETHASONE SODIUM PHOSPHATE 4 MG/ML
INJECTION, SOLUTION INTRA-ARTICULAR; INTRALESIONAL; INTRAMUSCULAR; INTRAVENOUS; SOFT TISSUE PRN
Status: DISCONTINUED | OUTPATIENT
Start: 2021-07-28 | End: 2021-07-28

## 2021-07-28 RX ORDER — SODIUM CHLORIDE, SODIUM LACTATE, POTASSIUM CHLORIDE, CALCIUM CHLORIDE 600; 310; 30; 20 MG/100ML; MG/100ML; MG/100ML; MG/100ML
INJECTION, SOLUTION INTRAVENOUS CONTINUOUS
Status: DISCONTINUED | OUTPATIENT
Start: 2021-07-28 | End: 2021-07-28 | Stop reason: HOSPADM

## 2021-07-28 RX ORDER — ACETAMINOPHEN 325 MG/1
650 TABLET ORAL EVERY 6 HOURS PRN
COMMUNITY
End: 2023-04-11

## 2021-07-28 RX ORDER — KETOROLAC TROMETHAMINE 30 MG/ML
30 INJECTION, SOLUTION INTRAMUSCULAR; INTRAVENOUS EVERY 6 HOURS
Status: COMPLETED | OUTPATIENT
Start: 2021-07-28 | End: 2021-07-29

## 2021-07-28 RX ORDER — ONDANSETRON 2 MG/ML
4 INJECTION INTRAMUSCULAR; INTRAVENOUS EVERY 6 HOURS PRN
Status: DISCONTINUED | OUTPATIENT
Start: 2021-07-28 | End: 2021-07-29 | Stop reason: HOSPADM

## 2021-07-28 RX ORDER — OXYCODONE HYDROCHLORIDE 5 MG/1
5 TABLET ORAL
Status: DISCONTINUED | OUTPATIENT
Start: 2021-07-28 | End: 2021-07-29 | Stop reason: HOSPADM

## 2021-07-28 RX ORDER — PROCHLORPERAZINE MALEATE 10 MG
10 TABLET ORAL EVERY 6 HOURS PRN
Status: DISCONTINUED | OUTPATIENT
Start: 2021-07-28 | End: 2021-07-29 | Stop reason: HOSPADM

## 2021-07-28 RX ORDER — ONDANSETRON 2 MG/ML
INJECTION INTRAMUSCULAR; INTRAVENOUS PRN
Status: DISCONTINUED | OUTPATIENT
Start: 2021-07-28 | End: 2021-07-28

## 2021-07-28 RX ORDER — DIPHENHYDRAMINE HCL 25 MG
25 CAPSULE ORAL EVERY 6 HOURS PRN
Status: DISCONTINUED | OUTPATIENT
Start: 2021-07-28 | End: 2021-07-29 | Stop reason: HOSPADM

## 2021-07-28 RX ORDER — CEFAZOLIN SODIUM IN 0.9 % NACL 3 G/100 ML
3 INTRAVENOUS SOLUTION, PIGGYBACK (ML) INTRAVENOUS
Status: COMPLETED | OUTPATIENT
Start: 2021-07-28 | End: 2021-07-28

## 2021-07-28 RX ADMIN — HYDROMORPHONE HYDROCHLORIDE 0.5 MG: 1 INJECTION, SOLUTION INTRAMUSCULAR; INTRAVENOUS; SUBCUTANEOUS at 22:57

## 2021-07-28 RX ADMIN — PHENYLEPHRINE HYDROCHLORIDE 100 MCG: 10 INJECTION INTRAVENOUS at 10:24

## 2021-07-28 RX ADMIN — HYDROMORPHONE HYDROCHLORIDE 0.5 MG: 1 INJECTION, SOLUTION INTRAMUSCULAR; INTRAVENOUS; SUBCUTANEOUS at 17:39

## 2021-07-28 RX ADMIN — HYDROMORPHONE HYDROCHLORIDE 0.5 MG: 1 INJECTION, SOLUTION INTRAMUSCULAR; INTRAVENOUS; SUBCUTANEOUS at 15:38

## 2021-07-28 RX ADMIN — Medication 5 MG: at 08:06

## 2021-07-28 RX ADMIN — DEXMEDETOMIDINE HYDROCHLORIDE 0.3 MCG/KG/HR: 100 INJECTION, SOLUTION INTRAVENOUS at 07:44

## 2021-07-28 RX ADMIN — ROCURONIUM BROMIDE 20 MG: 10 INJECTION INTRAVENOUS at 08:15

## 2021-07-28 RX ADMIN — PHENYLEPHRINE HYDROCHLORIDE 100 MCG: 10 INJECTION INTRAVENOUS at 10:45

## 2021-07-28 RX ADMIN — PHENYLEPHRINE HYDROCHLORIDE 100 MCG: 10 INJECTION INTRAVENOUS at 10:08

## 2021-07-28 RX ADMIN — GLYCOPYRROLATE 1 MG: 0.2 INJECTION, SOLUTION INTRAMUSCULAR; INTRAVENOUS at 11:09

## 2021-07-28 RX ADMIN — PHENYLEPHRINE HYDROCHLORIDE 100 MCG: 10 INJECTION INTRAVENOUS at 09:25

## 2021-07-28 RX ADMIN — Medication 3 G: at 07:29

## 2021-07-28 RX ADMIN — PHENYLEPHRINE HYDROCHLORIDE 100 MCG: 10 INJECTION INTRAVENOUS at 08:26

## 2021-07-28 RX ADMIN — KETOROLAC TROMETHAMINE 30 MG: 30 INJECTION, SOLUTION INTRAMUSCULAR at 19:13

## 2021-07-28 RX ADMIN — HYDROMORPHONE HYDROCHLORIDE 0.5 MG: 1 INJECTION, SOLUTION INTRAMUSCULAR; INTRAVENOUS; SUBCUTANEOUS at 20:27

## 2021-07-28 RX ADMIN — Medication 5 MG: at 08:12

## 2021-07-28 RX ADMIN — PHENYLEPHRINE HYDROCHLORIDE 100 MCG: 10 INJECTION INTRAVENOUS at 09:54

## 2021-07-28 RX ADMIN — PHENYLEPHRINE HYDROCHLORIDE 100 MCG: 10 INJECTION INTRAVENOUS at 10:00

## 2021-07-28 RX ADMIN — ROCURONIUM BROMIDE 10 MG: 10 INJECTION INTRAVENOUS at 09:36

## 2021-07-28 RX ADMIN — DEXAMETHASONE SODIUM PHOSPHATE 4 MG: 4 INJECTION, SOLUTION INTRA-ARTICULAR; INTRALESIONAL; INTRAMUSCULAR; INTRAVENOUS; SOFT TISSUE at 07:55

## 2021-07-28 RX ADMIN — NEOSTIGMINE METHYLSULFATE 5 MG: 1 INJECTION, SOLUTION INTRAVENOUS at 11:09

## 2021-07-28 RX ADMIN — PROPOFOL 280 MG: 10 INJECTION, EMULSION INTRAVENOUS at 07:40

## 2021-07-28 RX ADMIN — PHENYLEPHRINE HYDROCHLORIDE 100 MCG: 10 INJECTION INTRAVENOUS at 09:08

## 2021-07-28 RX ADMIN — KETOROLAC TROMETHAMINE 30 MG: 30 INJECTION, SOLUTION INTRAMUSCULAR at 12:34

## 2021-07-28 RX ADMIN — PHENYLEPHRINE HYDROCHLORIDE 100 MCG: 10 INJECTION INTRAVENOUS at 09:00

## 2021-07-28 RX ADMIN — MIDAZOLAM 2 MG: 1 INJECTION INTRAMUSCULAR; INTRAVENOUS at 07:36

## 2021-07-28 RX ADMIN — PHENYLEPHRINE HYDROCHLORIDE 100 MCG: 10 INJECTION INTRAVENOUS at 07:51

## 2021-07-28 RX ADMIN — PHENYLEPHRINE HYDROCHLORIDE 100 MCG: 10 INJECTION INTRAVENOUS at 07:56

## 2021-07-28 RX ADMIN — PHENYLEPHRINE HYDROCHLORIDE 100 MCG: 10 INJECTION INTRAVENOUS at 09:15

## 2021-07-28 RX ADMIN — LIDOCAINE HYDROCHLORIDE 100 MG: 20 INJECTION, SOLUTION INFILTRATION; PERINEURAL at 07:40

## 2021-07-28 RX ADMIN — ROCURONIUM BROMIDE 20 MG: 10 INJECTION INTRAVENOUS at 09:00

## 2021-07-28 RX ADMIN — SODIUM CHLORIDE, POTASSIUM CHLORIDE, SODIUM LACTATE AND CALCIUM CHLORIDE: 600; 310; 30; 20 INJECTION, SOLUTION INTRAVENOUS at 06:27

## 2021-07-28 RX ADMIN — HEPARIN SODIUM 5000 UNITS: 10000 INJECTION, SOLUTION INTRAVENOUS; SUBCUTANEOUS at 07:49

## 2021-07-28 RX ADMIN — PHENYLEPHRINE HYDROCHLORIDE 100 MCG: 10 INJECTION INTRAVENOUS at 09:39

## 2021-07-28 RX ADMIN — PHENYLEPHRINE HYDROCHLORIDE 100 MCG: 10 INJECTION INTRAVENOUS at 08:36

## 2021-07-28 RX ADMIN — ONDANSETRON 4 MG: 2 INJECTION INTRAMUSCULAR; INTRAVENOUS at 10:46

## 2021-07-28 RX ADMIN — PHENYLEPHRINE HYDROCHLORIDE 100 MCG: 10 INJECTION INTRAVENOUS at 08:12

## 2021-07-28 RX ADMIN — ONDANSETRON 4 MG: 2 INJECTION INTRAMUSCULAR; INTRAVENOUS at 12:09

## 2021-07-28 RX ADMIN — Medication 5 MG: at 08:15

## 2021-07-28 RX ADMIN — PHENYLEPHRINE HYDROCHLORIDE 100 MCG: 10 INJECTION INTRAVENOUS at 08:06

## 2021-07-28 RX ADMIN — SODIUM CHLORIDE, POTASSIUM CHLORIDE, SODIUM LACTATE AND CALCIUM CHLORIDE: 600; 310; 30; 20 INJECTION, SOLUTION INTRAVENOUS at 09:16

## 2021-07-28 RX ADMIN — FENTANYL CITRATE 100 MCG: 50 INJECTION, SOLUTION INTRAMUSCULAR; INTRAVENOUS at 07:40

## 2021-07-28 RX ADMIN — ROCURONIUM BROMIDE 20 MG: 10 INJECTION INTRAVENOUS at 10:01

## 2021-07-28 RX ADMIN — ROCURONIUM BROMIDE 50 MG: 10 INJECTION INTRAVENOUS at 07:40

## 2021-07-28 RX ADMIN — Medication 5 MG: at 08:36

## 2021-07-28 RX ADMIN — PHENYLEPHRINE HYDROCHLORIDE 100 MCG: 10 INJECTION INTRAVENOUS at 09:45

## 2021-07-28 RX ADMIN — HYDROMORPHONE HYDROCHLORIDE 0.5 MG: 1 INJECTION, SOLUTION INTRAMUSCULAR; INTRAVENOUS; SUBCUTANEOUS at 08:39

## 2021-07-28 ASSESSMENT — ENCOUNTER SYMPTOMS: SEIZURES: 0

## 2021-07-28 ASSESSMENT — ACTIVITIES OF DAILY LIVING (ADL)
ADLS_ACUITY_SCORE: 18
ADLS_ACUITY_SCORE: 20

## 2021-07-28 ASSESSMENT — MIFFLIN-ST. JEOR: SCORE: 1994.32

## 2021-07-28 ASSESSMENT — LIFESTYLE VARIABLES: TOBACCO_USE: 1

## 2021-07-28 NOTE — ANESTHESIA CARE TRANSFER NOTE
Patient: Vaibhav Vaca    Procedure(s):  ROBOTIC ASSISTED LAPAROSCOPIC REVISION TO GASTRIC BYPASS    Diagnosis: Obesity, Class III, BMI 40-49.9 (morbid obesity) (H) [E66.01]  Gastroesophageal reflux disease [K21.9]  Diagnosis Additional Information: No value filed.    Anesthesia Type:   General     Note:    Oropharynx: oropharynx clear of all foreign objects  Level of Consciousness: awake  Oxygen Supplementation: face mask    Independent Airway: airway patency satisfactory and stable  Dentition: dentition unchanged  Vital Signs Stable: post-procedure vital signs reviewed and stable  Report to RN Given: handoff report given  Patient transferred to: PACU    Handoff Report: Identifed the Patient, Identified the Reponsible Provider, Reviewed the pertinent medical history, Discussed the surgical course, Reviewed Intra-OP anesthesia mangement and issues during anesthesia, Set expectations for post-procedure period and Allowed opportunity for questions and acknowledgement of understanding      Vitals:  Vitals Value Taken Time   /88    Temp     Pulse 82    Resp 12    SpO2         Electronically Signed By: ESTELITA Harden CRNA  July 28, 2021  11:26 AM

## 2021-07-28 NOTE — ANESTHESIA POSTPROCEDURE EVALUATION
Patient: Vaibhav Vaca    Procedure(s):  ROBOTIC ASSISTED LAPAROSCOPIC REVISION TO GASTRIC BYPASS    Diagnosis:Obesity, Class III, BMI 40-49.9 (morbid obesity) (H) [E66.01]  Gastroesophageal reflux disease [K21.9]  Diagnosis Additional Information: No value filed.    Anesthesia Type:  General    Note:  Disposition: Inpatient   Postop Pain Control: Uneventful            Sign Out: Well controlled pain   PONV: No   Neuro/Psych: Uneventful            Sign Out: Acceptable/Baseline neuro status   Airway/Respiratory: Uneventful            Sign Out: Acceptable/Baseline resp. status   CV/Hemodynamics: Uneventful            Sign Out: Acceptable CV status   Other NRE: NONE   DID A NON-ROUTINE EVENT OCCUR? No           Last vitals:  Vitals Value Taken Time   /87 07/28/21 1220   Temp 36.1  C (96.9  F) 07/28/21 1200   Pulse 74 07/28/21 1229   Resp 22 07/28/21 1229   SpO2 100 % 07/28/21 1249   Vitals shown include unvalidated device data.    Electronically Signed By: Gabino Maguire MD  July 28, 2021  12:58 PM

## 2021-07-28 NOTE — OP NOTE
PREOPERATIVE DIAGNOSIS: Intractable acid reflux disease as well as recurrent morbid obesity following previous laparoscopic sleeve gastrectomy, multiple obesity related medical conditions including hypertension, hypercholesterolemia, prediabetes, obstructive sleep apnea. Failure of other methods of permanent weight loss including prior sleeve gastrectomy, exercise, calorie counting. BMI at the time of surgeon consultation 48.51    POSTOPERATIVE DIAGNOSIS: Same    PROCEDURE: Robotic assisted laparoscopic revision of previous sleeve gastrectomy to Emmie-en-Y gastric bypass    SURGEON: Michel Shepherd MD     ASSISTANT: Favian Love PA-C, Physician assistant first assistant was necessary during the performance of this procedure for expertise in patient positioning, prepping, draping, trocar placement, camera management, retraction and exposure, and suctioning.    ANESTHESIA: General endotracheal.     ESTIMATED BLOOD LOSS: 50 cc.     DRAINS: None.     COMPLICATIONS: None.     SPECIMENS: None.     OPERATIVE INDICATIONS: Vaibhav Vaca has undergone the preoperative screening process through the [facility] and has been deemed an appropriate candidate for revisional bariatric  Surgery. The patient underwent a laparoscopic sleeve gastrectomy in 2015. Prior to this procedure his weight was around 290 pounds. In the first 6 to 12 months after surgery he had lost approximately 60 pounds. He then developed significant and intractable acid reflux disease. Attempts were made to manage this symptomatically however this failed. This also resulted in maladaptive eating behaviors which led to significant weight regain. At the time of my surgeon consultation with him his weight was 282 pounds and his BMI was 48.51. He  was furnished with a copy of our specialized consent form for said procedure. This document was reviewed with him  in great detail. After thoroughly discussing the procedures, potential  risks and anticipated outcome he  wished to proceed.   Moreover, as the surgeon performing this procedure, I certify that the following are true in regards to this patient at or prior to the day of surgery:   1. The patient's body mass index (BMI) is or has been greater than or equal to 35 kg/m2.  2. The patient has at least one co-morbidity related to obesity (as outlined above).  3. The patient has been previously unsuccessful with medical treatment for obesity.  Please note that some of this information has been documented as part of a comprehensive pre-bariatric surgery process in the outpatient clinic and is NOT immediately available in the inpatient encounter for this bariatric operation.    DESCRIPTION OF PROCEDURE: After informed consent was obtained, the patient was taken to the operating room and placed supine on the operating table. Following the induction of adequate general endotracheal anesthesia, the patient's abdomen was shaved, prepped and draped in the usual fashion. A surgical timeout was initiated and completed. Appropriate IV antibiotics as well as subq heparin were administered. Skin incision was then made to the left of the umbilicus in the mid abdomen and a 5 mm optical trocar was used to gain access to the peritoneal cavity. Carbon dioxide pneumoperitoneum was then established.  Under direct vision a Caron liver retractor was then introduced through a subxiphoid stab incision and used to elevate the left lobe of the liver. Visualized within the space underneath the left lobe of liver there was significant adhesive disease between the liver as and the sleeve gastrectomy. Under direct vision the following ports were then placed: 12 mm robotic port was placed in the right mid abdomen, inferior and medial to this between this 12 mm port in the initial 5 mm port a 5 mm port was placed, 12 mm port was placed left lateral to the initial 5 mm port, an 8 mm port was placed in the left anterior  axillary line, finally the initial 5 mm port was exchanged for an 8 mm robotic port.  The robot was then uneventfully docked and I assumed control of the surgeon console. Initial surveillance in the upper abdomen I could only see the distal portion of the gastric sleeve. This was secondary to what appeared to be omental adhesions on the anterior surface of the entire sleeve extending up to the level of the esophageal hiatus. Careful dissection utilizing vessel sealer as well as scissors was used to ultimately expose the sleeve gastrectomy. The sleeve itself appeared to be angulated as well as flipped at the level of the angularis. This resulted in what would and should have been the left lateral aspect of the sleeve essentially being twisted up and adherent to the undersurface of the liver. I ultimately was able to dissect along the lateral aspect of the sleeve and gained access to the retrogastric space. This dissection was carried all the way up to the level of the diaphragm. There was significant scarring along the lesser omentum which made it challenging to ultimately get access to the retrogastric space. During the course of dissecting this area I had the anesthetist pass the 36 [place] gastrostomy tube. This allowed me to visualize the sleeve gastrectomy and ultimately to size my gastric pouch. I ultimately was able to gain good access into the retrogastric space such that I could completely isolate the sleeve gastrectomy. The G-tube was positioned in an appropriate position and several firings of the 60 mm stapler were used to isolate the G-tube into a completely divided gastric pouch that was less than 40 cc in volume.  With this then completed the G-tube was backed up into the esophagus.  The greater omentum was grasped and elevated.  This was divided from its distal tip to the level of the transverse colon.  The transverse colon was then reflected superiorly and the small bowel was run proximally to the  ligament of Treitz.  60 cm distal to ligament of Treitz a loop omega limb was brought up in an antecolic/antigastric manner.  I then proceeded with 2 layer handsewn gastrojejunostomy.  Initial posterior layer of suture was placed using 2-0 PDS strata fix suture.  A gastrotomy as well as enterotomy was then created using monopolar scissors.  The enterotomy in both the stomach pouch and the small bowel was large enough to accommodate the 36 [place] VISI G-tube.  Posterior mucosal layer of sutures was then placed using 2-0 PDS strata fix suture.  I then initiated anterior mucosal closure again with 2-0 PDS strata fix suture.  When this was nearly closed the VISI G-tube was passed through the anastomosis and into the proximal small bowel and directed down what would be, are bypass Emmie limb in the omega limb that had been brought up.  The anterior mucosal closure was then completed.  Second layer closure was performed over this with 2-0 PDS strata fix suture.  Proximal to the gastrojejunostomy the omega limb was then divided using the 60 mm blue load stapler.  Leak test was then performed using immunofluorescence.  There was no evidence of leakage.  I then measured out 125 cm Emmie limb and a side-to-side jejunojejunostomy was created using the 60 mm blue load stapler.  The enterotomies for the stapler were sutured closed using 2-0 PDS strata fix suture.  The mesenteric defect was closed using running 2-0 silk suture.  This completed our Emmie-en-Y gastric bypass, the robot was then undocked.  The fascia at the 12 mm port sites were closed using a fascial closure instrument and 0 Vicryl tie. Trocars as well as Caron retractor were all then removed. Carbon dioxide was massaged from the abdomen. Local anesthetic was injected at the incision sites and the incisions were all closed with subcuticular 4-0 Vicryl stitches. Benzoin and Steri-Strips were applied. Needle and sponge counts were correct. The patient tolerated this  without difficulty, was awakened in the operating room and taken to the recovery room in stable condition.     ZAINAB RONDON MD

## 2021-07-28 NOTE — BRIEF OP NOTE
[facility]    Brief Operative Note    Pre-operative diagnosis: Obesity, Class III, BMI 40-49.9 (morbid obesity) (H) [E66.01]  Gastroesophageal reflux disease [K21.9]  Post-operative diagnosis Morbid Obesity, Severe GERD    Procedure: Procedure(s):  ROBOTIC ASSISTED LAPAROSCOPIC REVISION TO GASTRIC BYPASS     Surgeon: Surgeon(s) and Role:     * Michel Shepherd MD - Primary     * Favian Love PA-C - Assisting     Anesthesia: General   Estimated blood loss: Less than 50 ml  Drains: None  Specimens: * No specimens in log *  Findings:   None.  Complications: None.  Implants: * No implants in log *

## 2021-07-28 NOTE — OR NURSING
Pt was bladder scan for 146 ml. Difficult to bladder scan due to his obesity. Offer to straight cath but pt refused. IV fluids  from OR was 1700 ml.

## 2021-07-28 NOTE — ANESTHESIA PROCEDURE NOTES
Airway         Procedure Start/Stop Times: 7/28/2021 7:43 AM  Staff -        Anesthesiologist:  Gabino Maguire MD       CRNA: Karie Ospina APRN CRNA       Other Anesthesia Staff: González Lambert       Performed By: SRNA  Consent for Airway        Urgency: elective  Indications and Patient Condition       Indications for airway management: david-procedural       Induction type:intravenous       Mask difficulty assessment: 2 - vent by mask + OA or adjuvant +/- NMBA    Final Airway Details       Final airway type: endotracheal airway       Successful airway: ETT - single and Oral  Endotracheal Airway Details        ETT size (mm): 8.0       Cuffed: yes       Successful intubation technique: video laryngoscopy       VL Blade Size: Glidescope 4       Grade View of Cords: 1       Adjucts: stylet       Position: Left       Measured from: gums/teeth       Secured at (cm): 23       Bite block used: None    Post intubation assessment        Placement verified by: capnometry, equal breath sounds and chest rise        Number of attempts at approach: 1       Number of other approaches attempted: 0       Secured with: commercial tube frazier and pink tape       Ease of procedure: easy       Dentition: Intact and Unchanged

## 2021-07-28 NOTE — PLAN OF CARE
A/O x4. AVSS on RA; home CPAP when sleeping. HTN at times. Ambulating halls SBA. Pain managed w/ IV dilaudid. Port sites x5, steri strips. Abdominal binder in place. CMS intact. NPO; tolerating ice chips. Voiding.

## 2021-07-28 NOTE — ANESTHESIA PREPROCEDURE EVALUATION
Anesthesia Pre-Procedure Evaluation    Patient: Vaibhav Vaca   MRN: [id number] : 1965        Preoperative Diagnosis: Obesity, Class III, BMI 40-49.9 (morbid obesity) (H) [E66.01]  Gastroesophageal reflux disease [K21.9]   Procedure : Procedure(s):  ROBOTIC ASSISTED LAPAROSCOPIC REVISION TO GASTRIC BYPASS     Past Medical History:   Diagnosis Date     Essential hypertension 2014     GERD (gastroesophageal reflux disease) 5/15/2017     History of kidney stones 8/3/2017     Hypercholesteremia      Hyperlipemia      CHANTELLE (obstructive sleep apnea) 3/10/2015    uses CPAP     Vitamin D deficiency 2014      Past Surgical History:   Procedure Laterality Date     CHOLECYSTECTOMY, LAPOROSCOPIC       GI SURGERY  03/10/2015    sleeve gastrectomy      LAPAROSCOPIC GASTRIC SLEEVE        No Known Allergies   Social History     Tobacco Use     Smoking status: Former Smoker     Smokeless tobacco: Never Used   Substance Use Topics     Alcohol use: Not Currently      Wt Readings from Last 1 Encounters:   21 123.8 kg (273 lb)        Anesthesia Evaluation            ROS/MED HX  ENT/Pulmonary:     (+) sleep apnea, uses CPAP, tobacco use, Past use,     Neurologic:    (-) no seizures and no CVA   Cardiovascular:     (+) Dyslipidemia hypertension-----    METS/Exercise Tolerance: >4 METS    Hematologic:       Musculoskeletal:       GI/Hepatic:     (+) GERD,     Renal/Genitourinary:    (-) renal disease   Endo:     (+) Obesity,  (-) Type II DM and thyroid disease   Psychiatric/Substance Use:       Infectious Disease:       Malignancy:       Other:            Physical Exam    Airway        Mallampati: I   TM distance: > 3 FB   Neck ROM: full   Mouth opening: > 3 cm    Respiratory Devices and Support         Dental  no notable dental history         Cardiovascular          Rhythm and rate: regular and normal     Pulmonary   pulmonary exam normal                OUTSIDE LABS:  CBC:   Lab Results   Component Value  Date    HGB 14.5 07/28/2021     BMP:   Lab Results   Component Value Date     06/29/2021    POTASSIUM 3.8 07/28/2021    POTASSIUM 4.1 06/29/2021    CHLORIDE 105 06/29/2021    CO2 32 06/29/2021    BUN 18 06/29/2021    CR 1.20 06/29/2021    CR 1.25 02/02/2021    GLC 88 06/29/2021    GLC 93 02/02/2021     COAGS: No results found for: PTT, INR, FIBR  POC: No results found for: BGM, HCG, HCGS  HEPATIC:   Lab Results   Component Value Date    ALBUMIN 3.5 06/29/2021    PROTTOTAL 7.9 06/29/2021    ALT 37 06/29/2021    AST 31 06/29/2021    ALKPHOS 93 06/29/2021    BILITOTAL 0.4 06/29/2021     OTHER:   Lab Results   Component Value Date    A1C 5.6 02/02/2021    MONA 9.7 06/29/2021       Anesthesia Plan    ASA Status:  3   NPO Status:  NPO Appropriate    Anesthesia Type: General.     - Airway: ETT   Induction: Intravenous, Propofol.   Maintenance: Balanced.        Consents    Anesthesia Plan(s) and associated risks, benefits, and realistic alternatives discussed. Questions answered and patient/representative(s) expressed understanding.     - Discussed with:  Patient         Postoperative Care    Pain management: IV analgesics, Oral pain medications.   PONV prophylaxis: Ondansetron (or other 5HT-3), Dexamethasone or Solumedrol, Background Propofol Infusion     Comments:                Gabino Maguire MD

## 2021-07-29 VITALS
OXYGEN SATURATION: 93 % | BODY MASS INDEX: 46.61 KG/M2 | SYSTOLIC BLOOD PRESSURE: 146 MMHG | DIASTOLIC BLOOD PRESSURE: 90 MMHG | TEMPERATURE: 98.5 F | HEART RATE: 86 BPM | HEIGHT: 64 IN | RESPIRATION RATE: 16 BRPM | WEIGHT: 273 LBS

## 2021-07-29 LAB
ANION GAP SERPL CALCULATED.3IONS-SCNC: 1 MMOL/L (ref 3–14)
CHLORIDE BLD-SCNC: 105 MMOL/L (ref 94–109)
CO2 SERPL-SCNC: 31 MMOL/L (ref 20–32)
GLUCOSE BLDC GLUCOMTR-MCNC: 94 MG/DL (ref 70–99)
HGB BLD-MCNC: 12.7 G/DL (ref 13.3–17.7)
POTASSIUM BLD-SCNC: 3.8 MMOL/L (ref 3.4–5.3)
SODIUM SERPL-SCNC: 137 MMOL/L (ref 133–144)

## 2021-07-29 PROCEDURE — 250N000011 HC RX IP 250 OP 636: Performed by: PHYSICIAN ASSISTANT

## 2021-07-29 PROCEDURE — 80051 ELECTROLYTE PANEL: CPT | Performed by: PHYSICIAN ASSISTANT

## 2021-07-29 PROCEDURE — 250N000013 HC RX MED GY IP 250 OP 250 PS 637: Performed by: PHYSICIAN ASSISTANT

## 2021-07-29 PROCEDURE — 36415 COLL VENOUS BLD VENIPUNCTURE: CPT | Performed by: PHYSICIAN ASSISTANT

## 2021-07-29 PROCEDURE — 85018 HEMOGLOBIN: CPT | Performed by: PHYSICIAN ASSISTANT

## 2021-07-29 RX ORDER — ONDANSETRON 4 MG/1
4 TABLET, ORALLY DISINTEGRATING ORAL EVERY 6 HOURS PRN
Qty: 10 TABLET | Refills: 0 | Status: SHIPPED | OUTPATIENT
Start: 2021-07-29 | End: 2021-08-03

## 2021-07-29 RX ORDER — OXYCODONE HYDROCHLORIDE 5 MG/1
5 TABLET ORAL
Qty: 10 TABLET | Refills: 0 | Status: SHIPPED | OUTPATIENT
Start: 2021-07-29 | End: 2021-08-01

## 2021-07-29 RX ADMIN — OXYCODONE HYDROCHLORIDE 5 MG: 5 TABLET ORAL at 10:16

## 2021-07-29 RX ADMIN — KETOROLAC TROMETHAMINE 30 MG: 30 INJECTION, SOLUTION INTRAMUSCULAR at 01:14

## 2021-07-29 RX ADMIN — OMEPRAZOLE 20 MG: 20 CAPSULE, DELAYED RELEASE ORAL at 06:59

## 2021-07-29 RX ADMIN — KETOROLAC TROMETHAMINE 30 MG: 30 INJECTION, SOLUTION INTRAMUSCULAR at 06:59

## 2021-07-29 RX ADMIN — ENOXAPARIN SODIUM 40 MG: 40 INJECTION SUBCUTANEOUS at 10:16

## 2021-07-29 ASSESSMENT — ACTIVITIES OF DAILY LIVING (ADL)
ADLS_ACUITY_SCORE: 20

## 2021-07-29 NOTE — DISCHARGE SUMMARY
[facility] Discharge Summary    Vaibhav Vaca MRN# [id number]   Age: 55 year old YOB: 1965     Date of Admission:  7/28/2021  Date of Discharge:  7/29/2021  Admitting Provider:  Michel Shepherd MD  Discharge Provider:  Favian Love PA-C with Dr. Shepherd  Discharging Service: Bariatric Surgery     Primary Provider: Zahraa Headley  Primary Care Physician Phone Number: [phone]          Admission Diagnoses:   Principle Diagnosis: Obesity, Class III, BMI 40-49.9 (morbid obesity) (H) [E66.01]  Gastroesophageal reflux disease [K21.9]  Secondary Diagnoses associated with Obesity:  HTN, CHANTELLE, Hypercholesterolemia  Other Diagnoses: Previous gastric sleeve surgery           Discharge Diagnosis:     Obesity, Class III, BMI 40-49.9 (morbid obesity) (H) [E66.01]  Gastroesophageal reflux disease [K21.9]          Procedures:     Procedure(s): Robotic assisted laparoscopic revision of previous sleeve gastrectomy to Emmie-en-Y gastric bypass            Discharge Medications:     Current Discharge Medication List      START taking these medications    Details   omeprazole (PRILOSEC) 20 MG DR capsule Take 1 capsule (20 mg) by mouth every morning (before breakfast)  Qty: 90 capsule, Refills: 0    Associated Diagnoses: Obesity, Class III, BMI 40-49.9 (morbid obesity) (H); Bariatric surgery status; Gastroesophageal reflux disease, unspecified whether esophagitis present      ondansetron (ZOFRAN-ODT) 4 MG ODT tab Take 1 tablet (4 mg) by mouth every 6 hours as needed for nausea or vomiting  Qty: 10 tablet, Refills: 0    Associated Diagnoses: Obesity, Class III, BMI 40-49.9 (morbid obesity) (H); Bariatric surgery status; Gastroesophageal reflux disease, unspecified whether esophagitis present      oxyCODONE (ROXICODONE) 5 MG tablet Take 1 tablet (5 mg) by mouth every 3 hours as needed for moderate to severe pain  Qty: 10 tablet, Refills: 0    Associated Diagnoses: Obesity, Class III, BMI 40-49.9 (morbid  obesity) (H); Bariatric surgery status; Gastroesophageal reflux disease, unspecified whether esophagitis present         CONTINUE these medications which have NOT CHANGED    Details   acetaminophen (TYLENOL) 325 MG tablet Take 650 mg by mouth every 6 hours as needed for mild pain      CALCIUM CITRATE PO Take 1 tablet by mouth daily       Cholecalciferol (VITAMIN D3 PO) Take 1 tablet by mouth daily      Cyanocobalamin (VITAMIN B12) 1000 MCG TBCR Take 1 tablet by mouth daily       multivitamin, therapeutic with minerals (MULTI-VITAMIN) TABS tablet Take 1 tablet by mouth daily         STOP taking these medications       aspirin (ASA) 325 MG EC tablet Comments:   Reason for Stopping:         hydrochlorothiazide (HYDRODIURIL) 12.5 MG tablet Comments:   Reason for Stopping:                   Allergies:       No Known Allergies          Brief History of Illness:   Vaibhav Vaca is a 55 year old-year-old male who underwent preoperative screening in anticipation for potential revision bariatric surgery. He had successful sleeve gastrectomy and saw improvement of multiple comorbidities and significant weight loss.  Unfortunately, he started having considerable GERD and weight regain.  He was deemed an appropriate candidate for bariatric surgery by the consensus of our [facility] team. In the office, surgical options were thoroughly discussed. He was furnished with a copy of our specialized consent form for bariatric surgery. The potential risks as outlined in that document were all thoroughly reviewed. All questions were answered and he wished to proceed.  On the day of surgery, after reviewing the risks, benefits, and possible complications, informed consent was obtained and the patient underwent the above procedure.  There were no complications.  Please see the Operative Report for full details.           Hospital Course:   Vaibhav Vaca's hospital course was unremarkable.  He recovered as anticipated and  "experienced no post-operative complications. He had mild nausea post op that was well controlled with antiemetics.  He was tolerating clear liquid diet, voiding well, ambulating well, and with good pain control at the time of discharge on POD1.    On the date of discharge, the patient was discharged to home in stable condition and afebrile.  He verbalized understanding of all discharge instructions and felt comfortable with the discharge plan.  He was asked to call with any further questions or concerns.           Condition on Discharge:        Discharge condition: Stable   Discharge vitals: Blood pressure (!) 146/90, pulse 86, temperature 98.5  F (36.9  C), temperature source Oral, resp. rate 16, height 1.626 m (5' 4\"), weight 123.8 kg (273 lb), SpO2 93 %.           Discharge Disposition:     Discharged to home          Discharge Instructions and Follow-Up:      Vaibhav Vaca was asked to follow up with the bariatric surgical team within 1 week.  He will see our clinic PA-C at that visit, with plans to reconvene with a Registered Dietician at the 2nd week office visit.    Favian Love PA-C  dictating on behalf of Dr. Gibran Shepherd  [phone]     "

## 2021-07-29 NOTE — DISCHARGE INSTRUCTIONS
After Bariatric Surgery Discharge Instructions  [facility]     Note: Ask your nurse to order your medications from the pharmacy. Be sure you have your medications with you when you leave.    What should my diet be for the first 2 weeks after surgery?    Follow the bariatric diet the dietitian discussed with you.    How much fluid should I drink?    Strive for 48-64 ounces daily.    Carry a water bottle with you without a straw or sports top. Drink from it often.    Keep track of how much fluid you drink in a day.    Remember:  -Do not use straws, chew gum or suck on hard candies. They may cause painful gas.    -Sip, don't slurp when you drink.    -Practice small sips using a medicine cup for the first week postop.   -No ice or cold drinks. This could cause gas or spasms.   -No coffee, soda pop or drinks with caffeine. These may cause stomach pain.   -No alcohol. It is bad for your liver and will cause stomach pain.    How often should I do my deep breathing and coughing?    Use your incentive spirometer (small plastic breathing device) every hour while awake after you get home. Using the incentive spirometer helps you deep breath. Continuing to cough and deep breath will help prevent fevers and pneumonia.     If you do not have a fever after one week, you can stop using the incentive spirometer and discard it.       You can continue to take deep breaths without the incentive spirometer every one to two hours while awake for the month after surgery.    What kinds of activity can I do?    Get plenty of rest the first few days after surgery and try to balance rest and activity. You will need some time to recover - you may be more tired than you realize at first. You'll feel better and heal faster if you take good care of yourself.  For 4 weeks after surgery (Please review restrictions at your one week visit, they could change based on how well you are doing):  -Don't lift more  than 20 pounds.   -Take 4-5 short walks every day.  -Don't jog, run, or do belly exercises.    Don't swim, bathe or use a hot tub until your cuts are healed (scabs are gone).  You may shower    Don't plan to fly or take a road trip within the first 1 to 3 months after surgery.  You could get a blood clot in your legs. If you must travel, get up and move around every hour for at least 5 minutes before continuing on your journey.    Your care team can help you decide when it's safe for you to travel.     What can I do for pain control?  You had major belly surgery that involves all layers of your belly muscles. Pain is expected, even for some as far out as 6-8 weeks after surgery. Moving, sneezing, coughing, and breathing will cause discomfort because these activities use your belly muscles.     Please see your after visit summary medication review for what pain medication will be continued, discontinued and newly started for you.      You can take opioid pain medicine if prescribed and if needed. Try to wean off from it as soon as you feel comfortable.     Do not drive while you are taking opioid pain medicine. This is dangerous.    You can take acetaminophen (Tylenol) between your prescribed pain medicine on a scheduled basis OR take it scheduled every 6 hours (check with your care team for specifics).  -Acetaminophen formulation options:    -Liquid   -Caplet (Cut caplet in half before taking)   -Do not take more than 3000 mg Tylenol in a 24 hour period.  -You may also take Tylenol for pain in place of the opioid pain medicine (check with your care team for specifics).     You can apply ice or heat to the affected area(s). Just remember to wrap the ice in something and limit icing sessions to 20 minutes. Excessive icing can irritate the skin or cause skin damage.    You can apply heat with a hot, wet towel or heating pad. Just like cold therapy, limit heat application to 20 minutes. Never sleep with a heating pad  on. It could cause severe burns to your skin.    Wear your binder to support your belly muscles if you have one.  Take this off a little more each day and try to be off completely by 2 weeks after surgery. If you don't need your binder for comfort or support, you don't need to wear it.     You may not be able to sleep in a comfortable position for a few weeks after surgery. This is normal. You may be more comfortable sleeping in a recliner or propped up with 3 pillows for the first couple of weeks after surgery.    Do not take NSAID's (Non-Steroidal Anti-Inflammatory Drugs) (examples: ibuprofen, Motrin, Advil, Aleve, and Naproxen), aspirin, or use pain patches with NSAID's. They will increase your risk of bleeding or getting an ulcer.    Please call the clinic for any of the following pain concerns, we would like to talk to you:  -pain that does not improve with rest  -pain that gets worse and worse  -pain that is not controlled by your pain medicine  -a sudden severe increase in pain    What medications will I need to take after surgery?    You may be discharged with the following types of medications: antacids, pain medications, anti-nausea medications, etc.  Please see your after visit summary medication review for what will be continued, discontinued and newly started.    It is important to reduce the amount of acid in your new stomach for a couple of months after surgery while it is still healing. We will prescribe an acid reducer or antacid. Take it as directed. This will help prevent ulcers, heartburn and acid reflux.    If you took an acid reducer before you had surgery, your care team will let you know what acid reducer you will take after surgery.     It is okay to swallow any medications smaller than   inch in size.   -If it is larger than   inch, it may need to be cut, crushed, or in a liquid form. Check with your care team about which way is most appropriate for you and your medications.    What should  I know about my incisions (cuts)?  Your incisions are covered with white steri strips or butterfly tape and have band aids or gauze over the top. If you have gauze or band aids, they can come off in the hospital.    Leave your steri strips on until they fall off on their own. If the steri strips don't fall off after 1 to 2 weeks, you can take them off. If they fall off earlier, replace them with clean band aids trying to avoid touching the incision itself.     If you have gauze covered by a clear dressing, remove 2-3 days after surgery or as directed by your care team.    You may shower in the hospital after surgery and can get your incision coverings wet.    Do not submerge in water (e.g. No baths, swimming pools, hot tubs) until your care team tells you it is okay and your incisions are completely healed.    Call the clinic if you have any of these signs or symptoms of infection:  -Redness around the site.  -Drainage that smells bad.  -Drainage that is thick yellow or green.  -An increase in pain around the incision site  -An increase in swelling around the incision site  -Heat or warmth around incision site   -Fever of 101.5  F (38.3  C) or higher when taken under the tongue.    -Chills    Will my urine or bowel movements change?   Your first bowel movements (stools) will likely be liquid. You may also notice old blood or a darker color (black or maroon color) in your bowel movements.  This is not unusual and usually goes away after the first week, if not sooner. You may not have a bowel movement for a week.     If you have not had a bowel movement for at least three days after your surgery date and are passing gas, you can use over the counter stool softeners.  Please stop taking the stool softeners and laxatives if your stools are loose.    Increasing fluids and activity as well as getting off narcotics will help prevent constipation.    Call the clinic if:   -You have stomach pain.   -You continue to have  "constipation.  -You have excessive bloating after walking and passing gas.    How can I prevent dehydration if I feel nauseated (sick to my stomach) and vomit (throw up)?     Vomiting is not normal after surgery. If you continue to have nausea and vomiting, call the clinic.     Nausea can be a sign of dehydration. That is why it is very important to track your fluids.    Do not nap more than one hour during the day. Set a timer to wake yourself up, if needed. Too much sleep will keep you from drinking enough fluid during the day and lead to dehydration.    No outside activity in hot, humid weather until you can drink 48 to 64 ounces of fluid in 24 hours. If you sweat a lot, your body may lose too much water.    Try to take a 1 ounce sip of water (one medicine cup) every 15 minutes.  Set a timer to remind yourself.    Call the clinic if you have any of these signs or symptoms of dehydration:  -Dark colored urine  -Urinating (pass water) less than 2-3 times per day  -Lack of energy  -Nausea  -Dizziness  -Headache    Call the clinic ANY TIME at [phone] if:  -Your pain medicine is not working.  -You have a fever ? 101.5 F.  -You have belly or left shoulder pain that gets worse and worse.  -You have a swollen leg with redness, warmth, or pain behind the knee or calf.  -You have chest pain   -You feel very short of breath.  -You have a sudden severe increase in heart rate.  -You have vomiting that gets worse and worse.  -You have constant nausea (feeling sick to your stomach) that does not go away with medication.  -You have trouble swallowing.  -You have an increasing feeling that \"something is not right\".  -You have hiccups that do not stop.  -You have any questions or concerns.    If you have to go to the Emergency Room, we prefer you go to the hospital that did your surgery. Please let them know that you had bariatric surgery and to notify your surgeon.    When should I go back to the clinic?    Follow up with " your care team in 1-2 weeks.     If this appointment was not already made, please call: [phone]

## 2021-07-29 NOTE — PLAN OF CARE
A&OX4. VSS on RA. Hypoactive BS, negative for flatus. Abdominal binder on. Some nausea, but improved on its own. Up walking independently. Encourage fluid intake when able. Voiding adequately. Pt discharge instruction and medication went over with pt. Questions and concerns answered. Pt discharge to home.

## 2021-07-29 NOTE — CONSULTS
"-NUTRITION EDUCATION    REASON FOR ASSESSMENT:  Bariatric Surgery Consult    CURRENT DIET:  Bariatric Clear Liquid    NUTRITION HISTORY:  Patient worked with clinical dietitian in [facility] Loss Clinic prior to surgery to assist with lifestyle modification.    ANTHROPOMETRICS:   Ht: 5'4\"  Wt: 123.8 kg  BMI: 46.8 kg/(m^2)  IBW: 59 kg  %IBW: 208%    ASSESSED NUTRITION NEEDS:  Energy Needs: 6311-0156 kcals (11 - 14 kcal/kg ABW)                      Protein Needs: 59-88 gm (1 - 1.5 gm/kg IBW)  Fluid Needs: 3149-8348 (30-35 mL/kg ABW)     Know patient will not meet assessed needs due to the restrictive nature of surgery.    NUTRITION DIAGNOSIS:   Food- and nutrition related knowledge deficit related to bariatric surgery as evidence by revision of sleeve gastrectomy to gastric bypass surgery on 7/28/2021.    INTERVENTIONS:    Nutrition Prescription:    Recommended patient follow bariatric diet advancement for revision (sleeve to bypass)    Implementation:    Nutrition Education (Content):  a) Reviewed bariatric full liquid diet guidelines with patient.  b) Provided handouts:  Your Stage 2 Diet, Low-Fat Full Liquids and Supplements After Weight Loss Surgery    Nutrition Education (Application):  c) Discussed current eating habits and recommended alternative food choices  d) Patient verbalizes understanding of diet by listing appropriate foods for home    Anticipate good compliance    Diet Education - refer to Education Flowsheet    Goals:    Patient will follow bariatric diet advancement schedule    Patient will follow post-operative vitamin mineral schedule      Follow Up:    Patient to follow up in 2 weeks with RD in Weight Loss Clinic    Provided RD contact information for future questions      Ella Shaw, NANCY, LD  [facility] Outpatient Dietitian  [phone] (office phone)  "

## 2021-07-29 NOTE — PLAN OF CARE
Alert and oriented x 4. VSS. CMS intact. Diminished lung sounds. Denies SOB. Abdominal incision sites; 5 lap sites CDI. Hypoactive bowels sounds. Denies passing gas. Pain was managed with PRN Dilaudid. Voiding adequately. Up with one assist.

## 2021-07-30 ENCOUNTER — TELEPHONE (OUTPATIENT)
Dept: SURGERY | Facility: CLINIC | Age: 56
End: 2021-07-30

## 2021-07-30 NOTE — TELEPHONE ENCOUNTER
Called pt to check on postop status.  Pt responses below:    Surgery Date: 7/28/21  Surgery Type: Bypass  Surgeon: Joao    Fluid Intake: 50 oz yesterday, 15 oz so far today of water    Pain Assessment:    Pain level: 6/10  Location: right side lower stomach  When did it start: hospital    What makes it better or worse? Activity makes worse, rest makes better   Description: sharp pain   Has not been taking tylenol; advised he can take 1000mg TID max 3000mg/day.  Oxycodone: has not used yet, advised to use PRN;     Medications:  RX for PPI?  Omeprazole 20mg daily  Do you understand how to take your medications postop?  Yes  Reviewed postop med changes:  NEW Zofran, Oxycodone; STOP ASA, hydrochlorothiazide - pt says he was just taking ASA prior to surgery without an rx. Pt is going to talk to PCP today re: BP med due to elevated BP in hospital.   Have you restarted all of the medications that weren't changed after surgery?  Vitamins; has not restarted  Do you have any questions about how to take your medications?  No    Diet: Full liquids, has not tried yet  How tolerated? Tolerating clear liquids    Nausea: No  When does it occur? NA  Are you taking medications to help? NA  What aggravates your nausea? NA    Vomiting: No  When does it occur? NA  When did it start? NA  What are you vomiting? NA  Are you taking any medications to help it? NA    NSAIDs: No  Smoking: No    Fever: Felt feverish yesterday on his way home from hospital, does not feel feverish now; advised to take temperature when feeling like that and call clinic is >101F    Incisions: pt reports they look good; advised bandaids can come off but steristrips should stay on for 2 weeks; call clinic for s/s of infections.    BMs:  Last BM: Prior to sugery  Color/consistency: NA  Flatus: Burping, has not passed gas; is going continue moving, increase fluids to help get bowels moving.  Bloating/cramping:  No  Informed pt it is normal to have no stool for up to  a week if passing gas and not having bloating/cramping.    Urinary:  Urinating well,     Sleeping/Rest: not well last night due to pain; advised to utilize pain medication PRN for pain    Activity: moving frequently around the house    Activity caution:  Advised pt to be careful to avoid straining abdominal muscles during recovery.    Incentive Spirometer: Using, >2000ml    Other symptoms (CP/SOB/dizzy/rapid HR): No    Plan/Advice: 8/3 at 1:00 PM with RN/PADanishC  1 week followup appointment verified.   Use of incentive spirometer reinforced.  Call clinic with any questions or concerns.  Reviewed that clinic staff are available on call during nights and weekends for postoperative concerns.  Number given.    No further questions or concerns now. Pt agrees to call if having any questions or concerns.  Radha Gordon RN on 7/30/2021 at 10:17 AM

## 2021-08-03 ENCOUNTER — OFFICE VISIT (OUTPATIENT)
Dept: SURGERY | Facility: CLINIC | Age: 56
End: 2021-08-03
Payer: COMMERCIAL

## 2021-08-03 VITALS
SYSTOLIC BLOOD PRESSURE: 124 MMHG | OXYGEN SATURATION: 96 % | HEART RATE: 91 BPM | WEIGHT: 262.8 LBS | RESPIRATION RATE: 16 BRPM | TEMPERATURE: 97.8 F | BODY MASS INDEX: 45.11 KG/M2 | DIASTOLIC BLOOD PRESSURE: 87 MMHG

## 2021-08-03 DIAGNOSIS — K91.2 POSTOPERATIVE MALABSORPTION: ICD-10-CM

## 2021-08-03 DIAGNOSIS — E66.01 OBESITY, CLASS III, BMI 40-49.9 (MORBID OBESITY) (H): ICD-10-CM

## 2021-08-03 DIAGNOSIS — Z98.84 BARIATRIC SURGERY STATUS: ICD-10-CM

## 2021-08-03 DIAGNOSIS — E66.01 MORBID OBESITY (H): Primary | ICD-10-CM

## 2021-08-03 DIAGNOSIS — K91.2 POSTSURGICAL MALABSORPTION: ICD-10-CM

## 2021-08-03 DIAGNOSIS — G89.18 POST-OP PAIN: ICD-10-CM

## 2021-08-03 DIAGNOSIS — Z98.84 BARIATRIC SURGERY STATUS: Primary | ICD-10-CM

## 2021-08-03 PROCEDURE — 99024 POSTOP FOLLOW-UP VISIT: CPT | Performed by: PHYSICIAN ASSISTANT

## 2021-08-03 PROCEDURE — 99207 PR NO CHARGE NURSE ONLY: CPT

## 2021-08-03 RX ORDER — OXYCODONE HYDROCHLORIDE 5 MG/1
TABLET ORAL
Qty: 5 TABLET | Refills: 0 | Status: SHIPPED | OUTPATIENT
Start: 2021-08-03 | End: 2023-04-11

## 2021-08-03 NOTE — PROGRESS NOTES
[facility]   1 Week Surgical Follow-Up     PCP:  Zahraa Headley    DOS: 07/28/21  Surgeon: NATANAEL  Surgery Type: Bypass  HISTORY OF PRESENT ILLNESS:  Vaibhav Vaca returns today for her follow-up appointment status post Robotic assisted laparoscopic revision of previous sleeve gastrectomy to Emmie-en-Y gastric bypass.  Here with his wife today.  He is currently using Oxycodone 5 mg for pain medication. Taking 1-2 per day. Has not taken tylenol in a couple of days.  Refill Needed: Yes.  Patient's Pain Scale: 4. The pain is located right side abdomen.  Patient's current daily fluid intake in oz is: 50-60 ounces water and 1.5 Premeir Pro.  Patient has started postoperative Vitamins: No.  Using CPAP nightly    Activity:   Activity: walking inside   REVIEW OF SYSTEMS:  GI:    Nausea: No  Vomiting: No  Diarrhea: Yes  Constipation: No  Last BM: 3 times daily loose and brown  Dysphagia: No  GERD: No - Taking omeprazole daily    CV/Pulmonary:   Chest Pain: No  Dizzy: Yes  Light Headed: Yes  SOB: No - has not used IS in a couple of days. Did get it up to 3000.    Endo:  Diabetes: No  :    Contraception: male  Dysuria: No  Vascular:    LE Edema: No     PHYSICAL EXAMINATION:    /87 (BP Location: Right arm, Patient Position: Sitting, Cuff Size: Adult Large)   Pulse 91   Temp 97.8  F (36.6  C)   Resp 16   Wt 262 lb 12.8 oz (119.2 kg)   SpO2 96%   BMI 45.11 kg/m       GENERAL: No acute distress. Alert and oriented time 3.  HEART: Regular rate and rhythm.  LUNGS:  Clear to auscultation bilaterally.  ABDOMEN: Soft, incisions clean,dry, and intact. Tenderness WNL for post op.  EXTREMITIES: No lower extremity edema bilaterally. No calf swelling or tenderness. Negative es's  SKIN: No rashes.  PSYCHOLOGICAL: Stable. Pleasant      ASSESSMENT:    1. S/P bariatric surgery.  2. Post surgical malabsorption  3. Post op pain    PLAN:   Discussed taking 1000 mg  Tylenol 3 times daily for pain.  Also use ice  and heat prn.  RX for oxycodone 5 mg tablets #5 sent to pharmacy.   Talk to primary about aspirin use   Continue with recommended antacid medication for the next 3 months.   Strive to drink 64 oz of fluid daily.  Strive to move hourly while awake to decrease risk of developing a blood clot.  Continue to do deep breathing exercises twice daily or use your spirometer.  Follow up with your primary care provider to discuss obesity related conditions by one month post op.   Start recommended postoperative vitamins within in the first 6 weeks.  Advance diet per Dietitian at your 2 week appointment.   Make follow up appointment to meet with psychologist at 1 and 3 months post operatively.   Wear binder to support abdominal muscles and gradually wean off over the next few weeks.   Return to clinic for 2 wk post op appointment and bring post op vitamins along.  Call with questions or concerns at any time.

## 2021-08-03 NOTE — PATIENT INSTRUCTIONS
PLAN:   Discussed taking 1000 mg  Tylenol 3 times daily for pain.  Also use ice and heat prn.  RX for oxycodone 5 mg tablets #5 sent to pharmacy.   Talk to primary about aspirin use   Continue with recommended antacid medication for the next 3 months.   Strive to drink 64 oz of fluid daily.  Strive to move hourly while awake to decrease risk of developing a blood clot.  Continue to do deep breathing exercises twice daily or use your spirometer.  Follow up with your primary care provider to discuss obesity related conditions by one month post op.   Start recommended postoperative vitamins within in the first 6 weeks.  Advance diet per Dietitian at your 2 week appointment.   Make follow up appointment to meet with psychologist at 1 and 3 months post operatively.   Wear binder to support abdominal muscles and gradually wean off over the next few weeks.   Return to clinic for 2 wk post op appointment and bring post op vitamins along.  Call with questions or concerns at any time.

## 2021-08-11 ENCOUNTER — TELEPHONE (OUTPATIENT)
Dept: SURGERY | Facility: CLINIC | Age: 56
End: 2021-08-11

## 2021-08-11 NOTE — PROGRESS NOTES
Staff message received to call Tresa Alvarez RD about mutual patient. Briefly spoke with Tresa about needing to locate the KRISTINA Later was able to locate KRISTINA. Mychart message was sent to Patient requesting that he complete his 2 and 4 week post-op visits with [facility]. Left message for Tresa on her secure voice mail explaining our request to see patient at 2 and 4 weeks post-op. Left my voice mail for return call next week due to being off 8/12-8/13/21.  Gonzalo Morales RD, LD  [facility], [place]

## 2021-08-11 NOTE — CONFIDENTIAL NOTE
Called pt re: cancelled RN/RD appt today  No answer  Left VM to call clinic, number provided  Radha Gordon RN on 8/11/2021 at 8:32 AM

## 2021-08-16 ENCOUNTER — TELEPHONE (OUTPATIENT)
Dept: SURGERY | Facility: CLINIC | Age: 56
End: 2021-08-16

## 2021-08-16 NOTE — PROGRESS NOTES
Received voice mail message from Tresa Davenport RD ([facility])  that patient did not show up for his visit with her on 8/11/21 and he has not returned her call. She will encouraged patient to follow up wit his surgical group.  Gonzalo Morales RD, [facility]

## 2021-10-03 ENCOUNTER — HEALTH MAINTENANCE LETTER (OUTPATIENT)
Age: 56
End: 2021-10-03

## 2022-05-02 ENCOUNTER — DOCUMENTATION ONLY (OUTPATIENT)
Dept: SURGERY | Facility: CLINIC | Age: 57
End: 2022-05-02

## 2022-07-10 ENCOUNTER — HEALTH MAINTENANCE LETTER (OUTPATIENT)
Age: 57
End: 2022-07-10

## 2022-09-11 ENCOUNTER — HEALTH MAINTENANCE LETTER (OUTPATIENT)
Age: 57
End: 2022-09-11

## 2023-04-11 ENCOUNTER — OFFICE VISIT (OUTPATIENT)
Dept: FAMILY MEDICINE | Facility: CLINIC | Age: 58
End: 2023-04-11
Payer: COMMERCIAL

## 2023-04-11 VITALS
SYSTOLIC BLOOD PRESSURE: 134 MMHG | HEART RATE: 63 BPM | OXYGEN SATURATION: 98 % | DIASTOLIC BLOOD PRESSURE: 90 MMHG | HEIGHT: 64 IN | WEIGHT: 247.3 LBS | BODY MASS INDEX: 42.22 KG/M2

## 2023-04-11 DIAGNOSIS — Z13.1 SCREENING FOR DIABETES MELLITUS (DM): ICD-10-CM

## 2023-04-11 DIAGNOSIS — I10 ESSENTIAL HYPERTENSION: ICD-10-CM

## 2023-04-11 DIAGNOSIS — Z11.4 SCREENING FOR HIV (HUMAN IMMUNODEFICIENCY VIRUS): ICD-10-CM

## 2023-04-11 DIAGNOSIS — R10.13 DYSPEPSIA: ICD-10-CM

## 2023-04-11 DIAGNOSIS — K21.9 GASTROESOPHAGEAL REFLUX DISEASE, UNSPECIFIED WHETHER ESOPHAGITIS PRESENT: ICD-10-CM

## 2023-04-11 DIAGNOSIS — Z13.220 SCREENING FOR HYPERLIPIDEMIA: ICD-10-CM

## 2023-04-11 DIAGNOSIS — Z13.29 SCREENING FOR THYROID DISORDER: ICD-10-CM

## 2023-04-11 DIAGNOSIS — Z00.00 ROUTINE GENERAL MEDICAL EXAMINATION AT A HEALTH CARE FACILITY: Primary | ICD-10-CM

## 2023-04-11 DIAGNOSIS — Z12.11 COLON CANCER SCREENING: ICD-10-CM

## 2023-04-11 DIAGNOSIS — Z11.59 NEED FOR HEPATITIS C SCREENING TEST: ICD-10-CM

## 2023-04-11 DIAGNOSIS — Z13.0 SCREENING FOR DEFICIENCY ANEMIA: ICD-10-CM

## 2023-04-11 DIAGNOSIS — E66.01 OBESITY, CLASS III, BMI 40-49.9 (MORBID OBESITY) (H): ICD-10-CM

## 2023-04-11 DIAGNOSIS — Z98.84 BARIATRIC SURGERY STATUS: ICD-10-CM

## 2023-04-11 DIAGNOSIS — G47.33 OSA (OBSTRUCTIVE SLEEP APNEA): ICD-10-CM

## 2023-04-11 DIAGNOSIS — Z12.5 PROSTATE CANCER SCREENING: ICD-10-CM

## 2023-04-11 LAB
ALBUMIN SERPL-MCNC: 3.6 G/DL (ref 3.4–5)
ALP SERPL-CCNC: 120 U/L (ref 40–150)
ALT SERPL W P-5'-P-CCNC: 27 U/L (ref 0–70)
ANION GAP SERPL CALCULATED.3IONS-SCNC: 2 MMOL/L (ref 3–14)
AST SERPL W P-5'-P-CCNC: 20 U/L (ref 0–45)
BILIRUB SERPL-MCNC: 0.7 MG/DL (ref 0.2–1.3)
BUN SERPL-MCNC: 13 MG/DL (ref 7–30)
CALCIUM SERPL-MCNC: 9.3 MG/DL (ref 8.5–10.1)
CHLORIDE BLD-SCNC: 108 MMOL/L (ref 94–109)
CHOLEST SERPL-MCNC: 181 MG/DL
CO2 SERPL-SCNC: 29 MMOL/L (ref 20–32)
CREAT SERPL-MCNC: 0.97 MG/DL (ref 0.66–1.25)
CREAT UR-MCNC: 184 MG/DL
ERYTHROCYTE [DISTWIDTH] IN BLOOD BY AUTOMATED COUNT: 13.1 % (ref 10–15)
FASTING STATUS PATIENT QL REPORTED: YES
FERRITIN SERPL-MCNC: 26 NG/ML (ref 26–388)
FOLATE SERPL-MCNC: 8.2 NG/ML (ref 4.6–34.8)
GFR SERPL CREATININE-BSD FRML MDRD: >90 ML/MIN/1.73M2
GLUCOSE BLD-MCNC: 88 MG/DL (ref 70–99)
HBA1C MFR BLD: 5.7 % (ref 0–5.6)
HCT VFR BLD AUTO: 42.8 % (ref 40–53)
HDLC SERPL-MCNC: 65 MG/DL
HGB BLD-MCNC: 13.9 G/DL (ref 13.3–17.7)
IRON SATN MFR SERPL: 21 % (ref 15–46)
IRON SERPL-MCNC: 70 UG/DL (ref 35–180)
LDLC SERPL CALC-MCNC: 97 MG/DL
MCH RBC QN AUTO: 28.9 PG (ref 26.5–33)
MCHC RBC AUTO-ENTMCNC: 32.5 G/DL (ref 31.5–36.5)
MCV RBC AUTO: 89 FL (ref 78–100)
MICROALBUMIN UR-MCNC: 6 MG/L
MICROALBUMIN/CREAT UR: 3.26 MG/G CR (ref 0–17)
NONHDLC SERPL-MCNC: 116 MG/DL
PLATELET # BLD AUTO: 247 10E3/UL (ref 150–450)
POTASSIUM BLD-SCNC: 4 MMOL/L (ref 3.4–5.3)
PROT SERPL-MCNC: 7.5 G/DL (ref 6.8–8.8)
PSA SERPL-MCNC: 0.41 UG/L (ref 0–4)
RBC # BLD AUTO: 4.81 10E6/UL (ref 4.4–5.9)
SODIUM SERPL-SCNC: 139 MMOL/L (ref 133–144)
TIBC SERPL-MCNC: 332 UG/DL (ref 240–430)
TRIGL SERPL-MCNC: 97 MG/DL
TSH SERPL DL<=0.005 MIU/L-ACNC: 0.88 MU/L (ref 0.4–4)
VIT B12 SERPL-MCNC: 519 PG/ML (ref 232–1245)
WBC # BLD AUTO: 5.5 10E3/UL (ref 4–11)

## 2023-04-11 PROCEDURE — 82728 ASSAY OF FERRITIN: CPT | Performed by: FAMILY MEDICINE

## 2023-04-11 PROCEDURE — 84443 ASSAY THYROID STIM HORMONE: CPT | Performed by: FAMILY MEDICINE

## 2023-04-11 PROCEDURE — 82607 VITAMIN B-12: CPT | Performed by: FAMILY MEDICINE

## 2023-04-11 PROCEDURE — 90471 IMMUNIZATION ADMIN: CPT | Performed by: FAMILY MEDICINE

## 2023-04-11 PROCEDURE — 87389 HIV-1 AG W/HIV-1&-2 AB AG IA: CPT | Performed by: FAMILY MEDICINE

## 2023-04-11 PROCEDURE — 86803 HEPATITIS C AB TEST: CPT | Performed by: FAMILY MEDICINE

## 2023-04-11 PROCEDURE — 80053 COMPREHEN METABOLIC PANEL: CPT | Performed by: FAMILY MEDICINE

## 2023-04-11 PROCEDURE — 82570 ASSAY OF URINE CREATININE: CPT | Performed by: FAMILY MEDICINE

## 2023-04-11 PROCEDURE — 83036 HEMOGLOBIN GLYCOSYLATED A1C: CPT | Performed by: FAMILY MEDICINE

## 2023-04-11 PROCEDURE — 80061 LIPID PANEL: CPT | Performed by: FAMILY MEDICINE

## 2023-04-11 PROCEDURE — 85027 COMPLETE CBC AUTOMATED: CPT | Performed by: FAMILY MEDICINE

## 2023-04-11 PROCEDURE — 83540 ASSAY OF IRON: CPT | Performed by: FAMILY MEDICINE

## 2023-04-11 PROCEDURE — G0103 PSA SCREENING: HCPCS | Performed by: FAMILY MEDICINE

## 2023-04-11 PROCEDURE — 82306 VITAMIN D 25 HYDROXY: CPT | Performed by: FAMILY MEDICINE

## 2023-04-11 PROCEDURE — 82043 UR ALBUMIN QUANTITATIVE: CPT | Performed by: FAMILY MEDICINE

## 2023-04-11 PROCEDURE — 82746 ASSAY OF FOLIC ACID SERUM: CPT | Performed by: FAMILY MEDICINE

## 2023-04-11 PROCEDURE — 36415 COLL VENOUS BLD VENIPUNCTURE: CPT | Performed by: FAMILY MEDICINE

## 2023-04-11 PROCEDURE — 99214 OFFICE O/P EST MOD 30 MIN: CPT | Mod: 25 | Performed by: FAMILY MEDICINE

## 2023-04-11 PROCEDURE — 83550 IRON BINDING TEST: CPT | Performed by: FAMILY MEDICINE

## 2023-04-11 PROCEDURE — 90750 HZV VACC RECOMBINANT IM: CPT | Performed by: FAMILY MEDICINE

## 2023-04-11 PROCEDURE — 99386 PREV VISIT NEW AGE 40-64: CPT | Mod: 25 | Performed by: FAMILY MEDICINE

## 2023-04-11 RX ORDER — FERROUS SULFATE 325(65) MG
325 TABLET ORAL
COMMUNITY
End: 2023-04-11

## 2023-04-11 RX ORDER — HYDROCHLOROTHIAZIDE 25 MG/1
25 TABLET ORAL EVERY MORNING
Qty: 30 TABLET | Refills: 0 | Status: SHIPPED | OUTPATIENT
Start: 2023-04-11

## 2023-04-11 RX ORDER — HYDROCHLOROTHIAZIDE 12.5 MG/1
12.5 TABLET ORAL
COMMUNITY
Start: 2021-02-02 | End: 2023-04-11

## 2023-04-11 ASSESSMENT — ENCOUNTER SYMPTOMS
WEAKNESS: 0
NERVOUS/ANXIOUS: 0
SHORTNESS OF BREATH: 0
MYALGIAS: 0
HEMATOCHEZIA: 0
PALPITATIONS: 0
CONSTIPATION: 0
PARESTHESIAS: 0
JOINT SWELLING: 0
EYE PAIN: 0
DIZZINESS: 0
COUGH: 0
DIARRHEA: 0
HEARTBURN: 0
ABDOMINAL PAIN: 0
ARTHRALGIAS: 0
FREQUENCY: 0
HEMATURIA: 0
FEVER: 0
SORE THROAT: 0
NAUSEA: 0
DYSURIA: 0
CHILLS: 0
HEADACHES: 0

## 2023-04-11 NOTE — PROGRESS NOTES
SUBJECTIVE:   CC: aVibhav is an 57 year old who presents for preventative health visit.   Patient is new to the provider, is here to establish care, for annual physical and with other concerns as mentioned below  He has a past medical history significant for hypertension, sleep apnea, GERD, status post bariatric surgery patient is requesting referral for sleep study is currently not using his CPAP  , Was taking 12.5 mg of hydrochlorothiazide 2 years ago but not anymore for hypertension   Denies chest pain, SOB, edema legs, visual concerns, focal neurological symptoms, dizziness, syncope, palpitations.  Patient is not taking any supplements after his bariatric surgery, he feels he has not been consistent with his exercises and healthy eating and noted weight gain after losing weight following the surgery  Patient is also reporting feeling gassy and bloated all the time with no concerns for constipation, diarrhea, regurgitation, heartburn, dysphagia, melena, hematemesis abdominal pain never had upper GI endoscopy or colonoscopy in the past,  Was taking omeprazole once daily for history of GERD 2 years ago  Denies history of smoking, use of alcohol, recreational drugs but drinks Diet Coke throughout the day every day        4/11/2023     9:58 AM   Additional Questions   Roomed by Susan   Accompanied by Wife- Summer         4/11/2023     9:58 AM   Patient Reported Additional Medications   Patient reports taking the following new medications None   Patient has been advised of split billing requirements and indicates understanding: Yes  Healthy Habits:     Getting at least 3 servings of Calcium per day:  Yes    Bi-annual eye exam:  Yes    Dental care twice a year:  Yes    Sleep apnea or symptoms of sleep apnea:  Sleep apnea    Diet:  Regular (no restrictions)    Frequency of exercise:  None    Taking medications regularly:  Yes    Medication side effects:  Not applicable    PHQ-2 Total Score: 0    Additional concerns  today:  Yes              Today's PHQ-2 Score:       4/11/2023     8:44 AM   PHQ-2 ( 1999 [employer])   Q1: Little interest or pleasure in doing things 0   Q2: Feeling down, depressed or hopeless 0   PHQ-2 Score 0   Q1: Little interest or pleasure in doing things Not at all    Not at all   Q2: Feeling down, depressed or hopeless Not at all    Not at all   PHQ-2 Score 0    0       Have you ever done Advance Care Planning? (For example, a Health Directive, POLST, or a discussion with a medical provider or your loved ones about your wishes): No    Social History     Tobacco Use     Smoking status: Former     Smokeless tobacco: Never     Tobacco comments:     Smoked for 6 years while in high school- 1/2 PPD   Vaping Use     Vaping status: Never Used   Substance Use Topics     Alcohol use: Not Currently             4/11/2023     8:44 AM   Alcohol Use   Prescreen: >3 drinks/day or >7 drinks/week? Not Applicable          View : No data to display.                Last PSA: No results found for: PSA    Reviewed orders with patient. Reviewed health maintenance and updated orders accordingly - Yes  Lab work is in process  Labs reviewed in EPIC  BP Readings from Last 3 Encounters:   04/11/23 (!) 134/90   08/03/21 124/87   07/29/21 (!) 146/90    Wt Readings from Last 3 Encounters:   04/11/23 112.2 kg (247 lb 4.8 oz)   08/03/21 119.2 kg (262 lb 12.8 oz)   07/28/21 123.8 kg (273 lb)                  Patient Active Problem List   Diagnosis     Morbid obesity (H)     Essential hypertension     GERD (gastroesophageal reflux disease)     History of kidney stones     Hyperlipidemia     CHANTELLE (obstructive sleep apnea)     Bariatric surgery status     Vitamin D deficiency     Obesity, Class III, BMI 40-49.9 (morbid obesity) (H)     Gastroesophageal reflux disease     Hyperlipemia     Postsurgical malabsorption     Dyspepsia     Past Surgical History:   Procedure Laterality Date     CHOLECYSTECTOMY, LAPOROSCOPIC       DAVINCI BYPASS GASTRIC  DENNISE-EN-Y N/A 7/28/2021    Procedure: ROBOTIC ASSISTED LAPAROSCOPIC REVISION TO GASTRIC BYPASS;  Surgeon: Michel Shepherd MD;  Location:  OR     GI SURGERY  03/10/2015    sleeve gastrectomy      LAPAROSCOPIC GASTRIC SLEEVE  2015       Social History     Tobacco Use     Smoking status: Former     Smokeless tobacco: Never     Tobacco comments:     Smoked for 6 years while in high school- 1/2 PPD   Vaping Use     Vaping status: Never Used   Substance Use Topics     Alcohol use: Not Currently     Family History   Problem Relation Age of Onset     Heart Disease Mother      Morbid Obesity Brother          Current Outpatient Medications   Medication Sig Dispense Refill     hydrochlorothiazide (HYDRODIURIL) 25 MG tablet Take 1 tablet (25 mg) by mouth every morning 30 tablet 0     omeprazole (PRILOSEC) 20 MG DR capsule Take 1 capsule (20 mg) by mouth every morning (before breakfast) 90 capsule 3     Allergies   Allergen Reactions     Nsaids Other (See Comments)     Gastric bypass     Recent Labs   Lab Test 07/29/21  0723 07/28/21  1522 07/28/21  0601 06/29/21  0828 02/02/21  0000   A1C  --   --   --   --  5.6   LDL  --   --   --   --  119*   HDL  --   --   --   --  60   TRIG  --   --   --   --  133   ALT  --   --   --  37 34   CR  --  1.25  --  1.20 1.25   GFRESTIMATED  --  65  --  68 >60   GFRESTBLACK  --   --   --  79 >60   POTASSIUM 3.8  --  3.8 4.1 4.3        Reviewed and updated as needed this visit by clinical staff   Tobacco  Allergies  Meds    Surg Hx           Reviewed and updated as needed this visit by Provider        Surg Hx            Past Medical History:   Diagnosis Date     Essential hypertension 9/17/2014     GERD (gastroesophageal reflux disease) 5/15/2017     History of kidney stones 8/3/2017     Hypercholesteremia      Hyperlipemia      CHANTELLE (obstructive sleep apnea) 3/10/2015    uses CPAP     Vitamin D deficiency 9/18/2014      Past Surgical History:   Procedure Laterality Date      CHOLECYSTECTOMY, LAPOROSCOPIC       DAVINCI BYPASS GASTRIC DENNISE-EN-Y N/A 7/28/2021    Procedure: ROBOTIC ASSISTED LAPAROSCOPIC REVISION TO GASTRIC BYPASS;  Surgeon: Michel Shepherd MD;  Location: [facility] OR     GI SURGERY  03/10/2015    sleeve gastrectomy      LAPAROSCOPIC GASTRIC SLEEVE  2015     OB History   No obstetric history on file.       Review of Systems   Constitutional: Negative for chills and fever.   HENT: Negative for congestion, ear pain, hearing loss and sore throat.    Eyes: Negative for pain and visual disturbance.   Respiratory: Negative for cough and shortness of breath.    Cardiovascular: Negative for chest pain, palpitations and peripheral edema.   Gastrointestinal: Negative for abdominal pain, constipation, diarrhea, heartburn, hematochezia and nausea.   Genitourinary: Negative for dysuria, frequency, genital sores, hematuria, impotence, penile discharge and urgency.   Musculoskeletal: Negative for arthralgias, joint swelling and myalgias.   Skin: Negative for rash.   Neurological: Negative for dizziness, weakness, headaches and paresthesias.   Psychiatric/Behavioral: Negative for mood changes. The patient is not nervous/anxious.      CONSTITUTIONAL: History of bariatric surgery, current obesity  INTEGUMENTARY/SKIN: NEGATIVE for worrisome rashes, moles or lesions  EYES: NEGATIVE for vision changes or irritation  ENT: NEGATIVE for ear, mouth and throat problems  RESP: NEGATIVE for significant cough or SOB  CV: NEGATIVE for chest pain, palpitations or peripheral edema  CV: History of hypertension  GI: History of GERD and current bloating   male: negative for dysuria, hematuria, decreased urinary stream, erectile dysfunction, urethral discharge  MUSCULOSKELETAL: NEGATIVE for significant arthralgias or myalgia  NEURO: NEGATIVE for weakness, dizziness or paresthesias  ENDOCRINE: NEGATIVE for temperature intolerance, skin/hair changes  HEME/ALLERGY/IMMUNE: NEGATIVE for bleeding  "problems  PSYCHIATRIC: NEGATIVE for changes in mood or affect    OBJECTIVE:   BP (!) 134/90 (BP Location: Right arm, Patient Position: Sitting, Cuff Size: Adult Large)   Pulse 63   Ht 1.626 m (5' 4\")   Wt 112.2 kg (247 lb 4.8 oz)   SpO2 98%   BMI 42.45 kg/m      Physical Exam  GENERAL: healthy, alert and no distress  EYES: Eyes grossly normal to inspection, PERRL and conjunctivae and sclerae normal  HENT: ear canals and TM's normal, nose and mouth without ulcers or lesions  NECK: no adenopathy, no asymmetry, masses, or scars and thyroid normal to palpation  RESP: lungs clear to auscultation - no rales, rhonchi or wheezes  CV: regular rate and rhythm, normal S1 S2, no S3 or S4, no murmur, click or rub, no peripheral edema and peripheral pulses strong  ABDOMEN: soft, nontender, no hepatosplenomegaly, no masses and bowel sounds normal  MS: no gross musculoskeletal defects noted, no edema  SKIN: no suspicious lesions or rashes  NEURO: Normal strength and tone, mentation intact and speech normal  PSYCH: mentation appears normal, affect normal/bright    Diagnostic Test Results:  Labs reviewed in Epic    ASSESSMENT/PLAN:   (Z00.00) Routine general medical examination at a health care facility  (primary encounter diagnosis)  Comment:   Plan: : Discussed on regular exercises, healthy eating,  and routine dental checks.    (I10) Essential hypertension  Comment:   Plan: CBC with platelets, Comprehensive metabolic         panel (BMP + Alb, Alk Phos, ALT, AST, Total.         Bili, TP), Albumin Random Urine Quantitative         with Creat Ratio, Adult Comprehensive Weight         Management  Referral, TSH with free T4        reflex, hydrochlorothiazide (HYDRODIURIL) 25 MG        tablet          BP Readings from Last 6 Encounters:   04/11/23 (!) 134/90   08/03/21 124/87   07/29/21 (!) 146/90   06/28/21 138/86   06/24/21 (!) 144/90   01/04/17 119/72     Reviewed moderately elevated blood pressures  Recommended to " start on hydrochlorothiazide 25 mg once daily in the morning, follow for recheck with PCP in 2 weeks along with BMP labs  We will get fasting labs today    (R10.13) Dyspepsia  Comment:   Plan: Helicobacter pylori Antigen Stool        Emphasized on stopping Diet Coke, cruciferous vegetables  Start back on the omeprazole 20 mg daily, will get a stool H. pylori testing for further evaluation due to concerns for abdominal bloating  Patient is status post a laparoscopic cholecystectomy    (K21.9) Gastroesophageal reflux disease, unspecified whether esophagitis present  Comment:   Plan: omeprazole (PRILOSEC) 20 MG DR capsule        as above      (G47.33) CHANTELLE (obstructive sleep apnea)  Comment:   Plan: Adult Comprehensive Weight Management         Referral, Adult Sleep Eval & Management          Referral        Recommended to consult sleep physician for further management of sleep apnea    (Z98.84) Bariatric surgery status  Comment:   Plan: Adult Comprehensive Weight Management         Referral, omeprazole (PRILOSEC) 20 MG DR         capsule, Ferritin, Iron and iron binding         capacity, Vitamin B12, Vitamin D Deficiency,         Folate, Hemoglobin A1c        We will get labs for ongoing care after bariatric surgery    (E66.01) Obesity, Class III, BMI 40-49.9 (morbid obesity) (H)  Comment:   Plan: Adult Comprehensive Weight Management         Referral, omeprazole (PRILOSEC) 20 MG DR         capsule, Hemoglobin A1c          Wt Readings from Last 5 Encounters:   04/11/23 112.2 kg (247 lb 4.8 oz)   08/03/21 119.2 kg (262 lb 12.8 oz)   07/28/21 123.8 kg (273 lb)   06/28/21 125.2 kg (276 lb)   06/24/21 128.2 kg (282 lb 9.6 oz)     Due to concern with weight gain, recommended to consult bariatric medical weight loss clinic for further evaluation emphasized on stopping all Diet Coke  , Follow portion control, healthy eating, regular exercises    (Z13.0) Screening for deficiency  "anemia  Comment:   Plan: CBC with platelets            (Z13.1) Screening for diabetes mellitus (DM)  Comment:   Plan: Comprehensive metabolic panel (BMP + Alb, Alk         Phos, ALT, AST, Total. Bili, TP), Hemoglobin         A1c            (Z13.220) Screening for hyperlipidemia  Comment:   Plan: Lipid panel reflex to direct LDL Fasting            (Z12.11) Colon cancer screening  Comment:   Plan: Colonoscopy Screening  Referral            (Z12.5) Prostate cancer screening  Comment:   Plan: PSA, screen            (Z13.29) Screening for thyroid disorder  Comment:   Plan: TSH with free T4 reflex            (Z11.4) Screening for HIV (human immunodeficiency virus)  Comment:   Plan: HIV Antigen Antibody Combo            (Z11.59) Need for hepatitis C screening test  Comment:   Plan: Hepatitis C Screen Reflex to HCV RNA Quant and         Genotype                    COUNSELING:   Reviewed preventive health counseling, as reflected in patient instructions  Special attention given to:        Regular exercise       Healthy diet/nutrition       Vision screening       Consider Hep C screening for all patients one time for ages 18-79 years       HIV screeninx in teen years, 1x in adult years, and at intervals if high risk       Colorectal cancer screening       Prostate cancer screening       The 10-year ASCVD risk score (Joan THEODORE, et al., 2019) is: 5.3%    Values used to calculate the score:      Age: 57 years      Sex: Male      Is Non- : No      Diabetic: No      Tobacco smoker: No      Systolic Blood Pressure: 134 mmHg      Is BP treated: No      HDL Cholesterol: 60 mg/dL      Total Cholesterol: 206 mg/dL      BMI:   Estimated body mass index is 42.45 kg/m  as calculated from the following:    Height as of this encounter: 1.626 m (5' 4\").    Weight as of this encounter: 112.2 kg (247 lb 4.8 oz).   Weight management plan: Patient referred to endocrine and/or weight management " specialty      He reports that he has quit smoking. He has never used smokeless tobacco.      Chart documentation done in part with Dragon Voice recognition Software. Although reviewed after completion, some word and grammatical error may remain.    Mary Koroma MD  [facility]

## 2023-04-12 LAB
DEPRECATED CALCIDIOL+CALCIFEROL SERPL-MC: 15 UG/L (ref 20–75)
HCV AB SERPL QL IA: NONREACTIVE
HIV 1+2 AB+HIV1 P24 AG SERPL QL IA: NONREACTIVE

## 2023-04-12 NOTE — RESULT ENCOUNTER NOTE
Abdullahi Esposito,  Your recent labs showed normal test results for hemoglobin and iron levels with no concern for anemia, normal B12, folate, PSA,-prostate cancer screening test, thyroid functions and urine exam with no protein leak.  These are good and reassuring  Your liver and kidney functions are good  Your A1c-3-month average of blood sugars is slightly elevated consistent with early prediabetes which should improve with ongoing weight loss  Your vitamin D levels are very low I would recommend to start taking over-the-counter vitamin D 5000 units once daily.  We will follow-up for high blood pressure as we discussed yesterday in 2 weeks after starting on the hydrochlorothiazide  We will recheck your potassium and kidney functions at that visit   Let me know if you have any questions. Take care.  Mary Koroma MD

## 2023-04-13 ENCOUNTER — TELEPHONE (OUTPATIENT)
Dept: FAMILY MEDICINE | Facility: CLINIC | Age: 58
End: 2023-04-13
Payer: COMMERCIAL

## 2023-04-13 NOTE — TELEPHONE ENCOUNTER
PT called and can not get in to the sleep center until Aug. Vaibhav would like a note from provider stating he does have Obstructive Sleep Apnea (noted on his chart).     He is having a hard time sitting for periods of time (makes him tired) likes to keep moving to stay alert.     Please call PT if there are any questions. Contact PT when note is complete and he will  at the .

## 2023-04-13 NOTE — TELEPHONE ENCOUNTER
We  need verification  What does patient need this note for?  Is he looking to get earlier appointment?  Did he look for other locations?

## 2023-04-14 NOTE — TELEPHONE ENCOUNTER
Routing to  to assist with provider's message below.     Noted no call back number or name listed for PT    SAUL Gonzalez  [facility]

## 2023-06-06 ENCOUNTER — TELEPHONE (OUTPATIENT)
Dept: GASTROENTEROLOGY | Facility: CLINIC | Age: 58
End: 2023-06-06
Payer: COMMERCIAL

## 2023-06-06 ENCOUNTER — HOSPITAL ENCOUNTER (OUTPATIENT)
Facility: CLINIC | Age: 58
End: 2023-06-06
Attending: INTERNAL MEDICINE | Admitting: INTERNAL MEDICINE
Payer: COMMERCIAL

## 2023-06-06 NOTE — TELEPHONE ENCOUNTER
"Endoscopy Scheduling Screen    Have you had a positive Covid test in the last 14 days?  No    Are you active on MyChart?   Yes    What insurance is in the chart?  Other:  [facility]    Ordering/Referring Provider: SARITA   (If ordering provider performs procedure, schedule with ordering provider unless otherwise instructed. )    BMI: Estimated body mass index is 42.45 kg/m  as calculated from the following:    Height as of 4/11/23: 1.626 m (5' 4\").    Weight as of 4/11/23: 112.2 kg (247 lb 4.8 oz).     Sedation Ordered  moderate sedation.   If patient BMI > 50 do not schedule in ASC.    Are you taking any prescription medications for pain?   No    Are you taking methadone or Suboxone?  No    Do you have a history of malignant hyperthermia or adverse reaction to anesthesia?  No    (Females) Are you currently pregnant?   No     Have you been diagnosed or told you have pulmonary hypertension?   No    Do you have an LVAD?  No    Have you been told you have moderate to severe sleep apnea?  No    Have you been told you have COPD, asthma, or any other lung disease?  No    Do you have any heart conditions?  No     Have you ever had or are you awaiting a heart or lung transplant?   No    Have you had a stroke or transient ischemic attack (TIA aka \"mini stroke\" in the last 6 months?   No    Have you been diagnosed with or been told you have cirrhosis of the liver?   No    Are you currently on dialysis?   No    Do you need assistance transferring?   No    BMI: Estimated body mass index is 42.45 kg/m  as calculated from the following:    Height as of 4/11/23: 1.626 m (5' 4\").    Weight as of 4/11/23: 112.2 kg (247 lb 4.8 oz).     Is patients BMI > 40 and scheduling location [facility]?  No    Do you take the medication Phentermine, Ozempic or Wegovy?  No    Do you take the medication Naltrexone?  No    Do you take blood thinners?  No    Preferred Pharmacy:    [id number]  [place] - [address] AT [facility] [address] [address]  Phone: [phone] Fax: [phone]      Prep   Are you currently on dialysis or do you have chronic kidney disease?  No (standard prep)    Do you have a diagnosis of diabetes?  No    Do you have a diagnosis of cystic fibrosis (CF)?  No    On a regular basis do you go 3 -5 days between bowel movements?  Yes (Extended Prep)    BMI > 40?  Yes (Extended Prep)    Final Scheduling Details   Colonoscopy prep sent?  Golytely Extended Prep    Procedure scheduled  COLONOSCOPY    Surgeon:  NEHEMIAH     Date of procedure:  6/21     Schedule PAC:   No    Location  [facility]    Sedation   Moderate Sedation    Patient Reminders:    You will receive a call from a Nurse to review instructions and health history.  This assessment must be completed prior to your procedure.  Failure to complete the Nurse assessment may result in the procedure being cancelled.       On the day of your procedure, please designate an adult(s) who can drive you home stay with you for the next 24 hours. The medicines used in the exam will make you sleepy. You will not be able to drive.       You cannot take public transportation, ride share services, or non-medical taxi service without a responsible caregiver.  Medical transport services are allowed with the requirement that a responsible caregiver will receive you at your destination.  We require that drivers and caregivers are confirmed prior to your procedure.

## 2023-06-07 ENCOUNTER — TELEPHONE (OUTPATIENT)
Dept: GASTROENTEROLOGY | Facility: CLINIC | Age: 58
End: 2023-06-07

## 2023-06-07 DIAGNOSIS — Z12.11 SPECIAL SCREENING FOR MALIGNANT NEOPLASMS, COLON: Primary | ICD-10-CM

## 2023-06-07 RX ORDER — LIDOCAINE 40 MG/G
CREAM TOPICAL
Status: CANCELLED | OUTPATIENT
Start: 2023-06-07

## 2023-06-07 RX ORDER — BISACODYL 5 MG/1
TABLET, DELAYED RELEASE ORAL
Qty: 4 TABLET | Refills: 0 | Status: SHIPPED | OUTPATIENT
Start: 2023-06-07

## 2023-06-07 RX ORDER — ONDANSETRON 2 MG/ML
4 INJECTION INTRAMUSCULAR; INTRAVENOUS
Status: CANCELLED | OUTPATIENT
Start: 2023-06-07

## 2023-06-07 NOTE — TELEPHONE ENCOUNTER
Patient scheduled for Colonoscopy  on 6/21/23.     Discuss Covid policy.     Pre op exam needed? N/A    Arrival time: 1245. Procedure time 1345    Facility location: [facility]; [address] [place] [address], [address] Floor, [place] [address]    Sedation type: Conscious sedation     NSAIDs? No NSAID medications per patient's medication list.  RN will verify with pre-assessment call.    Anticoagulations? No    Electronic implanted devices? No    Diabetic? No.    Indication for procedure: screening    Bowel prep recommendation: Extended prep Golytely d/t BMI    Prep instructions sent via [employer] Bowel prep script sent to     [facility] #[id number] [place], [place] - [address] AT [address]    Pre visit planning completed.    Fabienne Gonzales RN  Endoscopy Procedure Pre Assessment RN

## 2023-06-07 NOTE — TELEPHONE ENCOUNTER
Attempted to contact patient regarding upcoming Colonoscopy  procedure on 6/21/23 for pre assessment questions. No answer.     Left message to return call to [phone] #4    Fabienne Gonzales RN  Endoscopy Procedure Pre Assessment RN

## 2023-06-14 NOTE — TELEPHONE ENCOUNTER
Pre assessment questions completed for upcoming Colonoscopy  procedure scheduled on 06.21.2023    COVID policy reviewed.     Reviewed procedural arrival time 1245 and facility location [facility]; [address] [facility], [address] Floor, [place] [address]    Designated  policy reviewed. Instructed to have someone stay 6 hours post procedure.     NSAIDs? No    Anticoagulation/blood thinners? No    Electronic implanted devices? No    Diabetic? No.    Reviewed procedure prep instructions.     Patient verbalized understanding and had no questions or concerns at this time.    Fabienne Dover RN  Endoscopy Procedure Pre Assessment RN

## 2023-06-14 NOTE — TELEPHONE ENCOUNTER
Second attempt for pre-assessment prior to upcoming colonoscopy on 6.21.23     No answer.  Left message to return call [phone] #4    [employer] message sent.    Rhiannon Andrade RN  Endoscopy Procedure Pre Assessment RN

## 2023-06-20 ENCOUNTER — HOSPITAL ENCOUNTER (OUTPATIENT)
Facility: CLINIC | Age: 58
End: 2023-06-20
Attending: COLON & RECTAL SURGERY | Admitting: COLON & RECTAL SURGERY

## 2023-06-20 ENCOUNTER — TELEPHONE (OUTPATIENT)
Dept: GASTROENTEROLOGY | Facility: CLINIC | Age: 58
End: 2023-06-20
Payer: COMMERCIAL

## 2023-06-20 NOTE — TELEPHONE ENCOUNTER
Caller: ITALIA SALAS (SPOUSE)  Reason for Reschedule/Cancellation (please be detailed, any staff messages or encounters to note?): PT UNAVAILABLE      Prior to reschedule please review:    Ordering Provider:Mary Koroma MD    Sedation per order:MODERATE    Does patient have any ASC Exclusions, please identify?: N      Notes on Cancelled Procedure:    Procedure:Lower Endoscopy [Colonoscopy]     Date: 06/21/2023    Location:[facility]; [address] [place] [address]    Surgeon: NEHEMIAH        Rescheduled: Yes    Procedure: Lower Endoscopy [Colonoscopy]     Date: 08/23/2023    Location:[facility]; [address] [place] [address]    Surgeon: JOSIE    Sedation Level Scheduled  MODERATE,  Reason for Sedation Level PER ORDER    Prep/Instructions updated and sent: SENT VIA [employer]

## 2023-08-04 PROBLEM — E66.813 OBESITY, CLASS III, BMI 40-49.9 (MORBID OBESITY) (H): Chronic | Status: ACTIVE | Noted: 2021-07-08

## 2023-08-04 PROBLEM — Z98.84 BARIATRIC SURGERY STATUS: Chronic | Status: ACTIVE | Noted: 2017-11-17

## 2023-08-04 PROBLEM — E66.01 OBESITY, CLASS III, BMI 40-49.9 (MORBID OBESITY) (H): Chronic | Status: ACTIVE | Noted: 2021-07-08

## 2023-08-04 PROBLEM — E66.01 MORBID OBESITY (H): Chronic | Status: ACTIVE | Noted: 2021-06-24

## 2023-08-18 ENCOUNTER — TELEPHONE (OUTPATIENT)
Dept: GASTROENTEROLOGY | Facility: CLINIC | Age: 58
End: 2023-08-18

## 2023-08-18 NOTE — TELEPHONE ENCOUNTER
Caller: Vaibhav   Reason for Reschedule/Cancellation (please be detailed, any staff messages or encounters to note?): pt talked with  and would like to cancel appointment       Prior to reschedule please review:  Ordering Provider:     Mary Koroma MD in [facility]/PED[facility]     Sedation per order: Moderte  Does patient have any ASC Exclusions, please identify?: n      Notes on Cancelled Procedure:  Procedure: Lower Endoscopy [Colonoscopy]   Date: 08/23/2023  Location: [facility]; [facility] [place] [address] [place] [address]  Surgeon: Jacky      Rescheduled: No

## 2023-12-11 ENCOUNTER — TELEPHONE (OUTPATIENT)
Dept: FAMILY MEDICINE | Facility: CLINIC | Age: 58
End: 2023-12-11

## 2023-12-11 NOTE — TELEPHONE ENCOUNTER
Patient Quality Outreach    Patient is due for the following:   Hypertension -  BP check      Topic Date Due    Polio Vaccine (2 of 3 - Adult catch-up series) 05/06/2016    Flu Vaccine (1) 09/01/2023    COVID-19 Vaccine (8 - 2023-24 season) 09/01/2023    Zoster (Shingles) Vaccine (2 of 2) 06/06/2023       Next Steps:   Schedule a nurse only visit for BP Check    Type of outreach:    Sent [facility] message.      Questions for provider review:    None           Garry Wellington

## 2024-02-02 NOTE — PROGRESS NOTES
Paged Estuardo Love PA-C regarding loss of IV access. On clear liquid. Do we need to put in a new IV?    Heterosexual

## 2024-04-02 ENCOUNTER — DOCUMENTATION ONLY (OUTPATIENT)
Dept: SURGERY | Facility: CLINIC | Age: 59
End: 2024-04-02
Payer: COMMERCIAL

## 2024-05-28 ENCOUNTER — TRANSFERRED RECORDS (OUTPATIENT)
Dept: HEALTH INFORMATION MANAGEMENT | Facility: CLINIC | Age: 59
End: 2024-05-28

## 2024-07-15 RX ORDER — SEMAGLUTIDE 1.7 MG/.75ML
1.7 INJECTION, SOLUTION SUBCUTANEOUS WEEKLY
COMMUNITY

## 2024-07-15 RX ORDER — LANOLIN ALCOHOL/MO/W.PET/CERES
1000 CREAM (GRAM) TOPICAL WEEKLY
COMMUNITY

## 2024-07-17 ENCOUNTER — ANESTHESIA EVENT (OUTPATIENT)
Dept: SURGERY | Facility: CLINIC | Age: 59
End: 2024-07-17
Payer: COMMERCIAL

## 2024-07-18 ENCOUNTER — TRANSFERRED RECORDS (OUTPATIENT)
Dept: HEALTH INFORMATION MANAGEMENT | Facility: CLINIC | Age: 59
End: 2024-07-18

## 2024-07-18 ENCOUNTER — ANESTHESIA (OUTPATIENT)
Dept: SURGERY | Facility: CLINIC | Age: 59
End: 2024-07-18
Payer: COMMERCIAL

## 2024-07-18 ENCOUNTER — HOSPITAL ENCOUNTER (OUTPATIENT)
Facility: CLINIC | Age: 59
Discharge: HOME | End: 2024-07-18
Attending: UROLOGY | Admitting: UROLOGY
Payer: COMMERCIAL

## 2024-07-18 VITALS
TEMPERATURE: 97.4 F | SYSTOLIC BLOOD PRESSURE: 130 MMHG | BODY MASS INDEX: 39.64 KG/M2 | WEIGHT: 232.2 LBS | DIASTOLIC BLOOD PRESSURE: 84 MMHG | OXYGEN SATURATION: 97 % | HEIGHT: 64 IN | HEART RATE: 65 BPM | RESPIRATION RATE: 16 BRPM

## 2024-07-18 DIAGNOSIS — N20.0 RENAL STONES: Primary | ICD-10-CM

## 2024-07-18 PROCEDURE — 250N000013 HC RX MED GY IP 250 OP 250 PS 637: Performed by: UROLOGY

## 2024-07-18 PROCEDURE — 250N000011 HC RX IP 250 OP 636: Performed by: ANESTHESIOLOGY

## 2024-07-18 PROCEDURE — 999N000141 HC STATISTIC PRE-PROCEDURE NURSING ASSESSMENT: Performed by: UROLOGY

## 2024-07-18 PROCEDURE — 999N000094 HC STATISTIC LITHOTRIPSY IDENTIFIER: Performed by: UROLOGY

## 2024-07-18 PROCEDURE — 250N000025 HC SEVOFLURANE, PER MIN: Performed by: UROLOGY

## 2024-07-18 PROCEDURE — 50590 FRAGMENTING OF KIDNEY STONE: CPT | Performed by: NURSE ANESTHETIST, CERTIFIED REGISTERED

## 2024-07-18 PROCEDURE — 250N000011 HC RX IP 250 OP 636: Performed by: PHYSICIAN ASSISTANT

## 2024-07-18 PROCEDURE — 272N000002 HC OR SUPPLY OTHER OPNP: Performed by: UROLOGY

## 2024-07-18 PROCEDURE — 50590 FRAGMENTING OF KIDNEY STONE: CPT | Performed by: ANESTHESIOLOGY

## 2024-07-18 PROCEDURE — 710N000012 HC RECOVERY PHASE 2, PER MINUTE: Performed by: UROLOGY

## 2024-07-18 PROCEDURE — 370N000017 HC ANESTHESIA TECHNICAL FEE, PER MIN: Performed by: UROLOGY

## 2024-07-18 PROCEDURE — 250N000013 HC RX MED GY IP 250 OP 250 PS 637: Performed by: PHYSICIAN ASSISTANT

## 2024-07-18 PROCEDURE — 360N000077 HC SURGERY LEVEL 4, PER MIN: Performed by: UROLOGY

## 2024-07-18 PROCEDURE — 250N000009 HC RX 250: Performed by: NURSE ANESTHETIST, CERTIFIED REGISTERED

## 2024-07-18 PROCEDURE — 258N000003 HC RX IP 258 OP 636: Performed by: NURSE ANESTHETIST, CERTIFIED REGISTERED

## 2024-07-18 PROCEDURE — 710N000009 HC RECOVERY PHASE 1, LEVEL 1, PER MIN: Performed by: UROLOGY

## 2024-07-18 PROCEDURE — 258N000003 HC RX IP 258 OP 636: Performed by: ANESTHESIOLOGY

## 2024-07-18 PROCEDURE — 250N000011 HC RX IP 250 OP 636: Performed by: NURSE ANESTHETIST, CERTIFIED REGISTERED

## 2024-07-18 RX ORDER — DEXAMETHASONE SODIUM PHOSPHATE 4 MG/ML
INJECTION, SOLUTION INTRA-ARTICULAR; INTRALESIONAL; INTRAMUSCULAR; INTRAVENOUS; SOFT TISSUE PRN
Status: DISCONTINUED | OUTPATIENT
Start: 2024-07-18 | End: 2024-07-18

## 2024-07-18 RX ORDER — HYDROMORPHONE HCL IN WATER/PF 6 MG/30 ML
0.2 PATIENT CONTROLLED ANALGESIA SYRINGE INTRAVENOUS EVERY 5 MIN PRN
Status: DISCONTINUED | OUTPATIENT
Start: 2024-07-18 | End: 2024-07-18 | Stop reason: HOSPADM

## 2024-07-18 RX ORDER — CEFAZOLIN SODIUM/WATER 2 G/20 ML
2 SYRINGE (ML) INTRAVENOUS SEE ADMIN INSTRUCTIONS
Status: DISCONTINUED | OUTPATIENT
Start: 2024-07-18 | End: 2024-07-18 | Stop reason: HOSPADM

## 2024-07-18 RX ORDER — ONDANSETRON 2 MG/ML
INJECTION INTRAMUSCULAR; INTRAVENOUS PRN
Status: DISCONTINUED | OUTPATIENT
Start: 2024-07-18 | End: 2024-07-18

## 2024-07-18 RX ORDER — NALOXONE HYDROCHLORIDE 0.4 MG/ML
0.1 INJECTION, SOLUTION INTRAMUSCULAR; INTRAVENOUS; SUBCUTANEOUS
Status: DISCONTINUED | OUTPATIENT
Start: 2024-07-18 | End: 2024-07-18 | Stop reason: HOSPADM

## 2024-07-18 RX ORDER — FENTANYL CITRATE 50 UG/ML
INJECTION, SOLUTION INTRAMUSCULAR; INTRAVENOUS PRN
Status: DISCONTINUED | OUTPATIENT
Start: 2024-07-18 | End: 2024-07-18

## 2024-07-18 RX ORDER — TRAMADOL HYDROCHLORIDE 50 MG/1
50 TABLET ORAL 3 TIMES DAILY
Qty: 12 TABLET | Refills: 0 | Status: SHIPPED | OUTPATIENT
Start: 2024-07-18 | End: 2024-07-22

## 2024-07-18 RX ORDER — PROPOFOL 10 MG/ML
INJECTION, EMULSION INTRAVENOUS PRN
Status: DISCONTINUED | OUTPATIENT
Start: 2024-07-18 | End: 2024-07-18

## 2024-07-18 RX ORDER — CEFAZOLIN SODIUM/WATER 2 G/20 ML
2 SYRINGE (ML) INTRAVENOUS
Status: COMPLETED | OUTPATIENT
Start: 2024-07-18 | End: 2024-07-18

## 2024-07-18 RX ORDER — SODIUM CHLORIDE, SODIUM LACTATE, POTASSIUM CHLORIDE, CALCIUM CHLORIDE 600; 310; 30; 20 MG/100ML; MG/100ML; MG/100ML; MG/100ML
INJECTION, SOLUTION INTRAVENOUS CONTINUOUS
Status: DISCONTINUED | OUTPATIENT
Start: 2024-07-18 | End: 2024-07-18 | Stop reason: HOSPADM

## 2024-07-18 RX ORDER — HYDROMORPHONE HCL IN WATER/PF 6 MG/30 ML
0.4 PATIENT CONTROLLED ANALGESIA SYRINGE INTRAVENOUS EVERY 5 MIN PRN
Status: DISCONTINUED | OUTPATIENT
Start: 2024-07-18 | End: 2024-07-18 | Stop reason: HOSPADM

## 2024-07-18 RX ORDER — FENTANYL CITRATE 50 UG/ML
25 INJECTION, SOLUTION INTRAMUSCULAR; INTRAVENOUS EVERY 5 MIN PRN
Status: DISCONTINUED | OUTPATIENT
Start: 2024-07-18 | End: 2024-07-18 | Stop reason: HOSPADM

## 2024-07-18 RX ORDER — ONDANSETRON 4 MG/1
4 TABLET, ORALLY DISINTEGRATING ORAL EVERY 30 MIN PRN
Status: DISCONTINUED | OUTPATIENT
Start: 2024-07-18 | End: 2024-07-18 | Stop reason: HOSPADM

## 2024-07-18 RX ORDER — HYDROCODONE BITARTRATE AND ACETAMINOPHEN 5; 325 MG/1; MG/1
1 TABLET ORAL ONCE
Status: COMPLETED | OUTPATIENT
Start: 2024-07-18 | End: 2024-07-18

## 2024-07-18 RX ORDER — LIDOCAINE HYDROCHLORIDE 20 MG/ML
INJECTION, SOLUTION INFILTRATION; PERINEURAL PRN
Status: DISCONTINUED | OUTPATIENT
Start: 2024-07-18 | End: 2024-07-18

## 2024-07-18 RX ORDER — ONDANSETRON 2 MG/ML
4 INJECTION INTRAMUSCULAR; INTRAVENOUS EVERY 30 MIN PRN
Status: DISCONTINUED | OUTPATIENT
Start: 2024-07-18 | End: 2024-07-18 | Stop reason: HOSPADM

## 2024-07-18 RX ORDER — DEXAMETHASONE SODIUM PHOSPHATE 4 MG/ML
4 INJECTION, SOLUTION INTRA-ARTICULAR; INTRALESIONAL; INTRAMUSCULAR; INTRAVENOUS; SOFT TISSUE
Status: DISCONTINUED | OUTPATIENT
Start: 2024-07-18 | End: 2024-07-18 | Stop reason: HOSPADM

## 2024-07-18 RX ORDER — FENTANYL CITRATE 50 UG/ML
50 INJECTION, SOLUTION INTRAMUSCULAR; INTRAVENOUS EVERY 5 MIN PRN
Status: DISCONTINUED | OUTPATIENT
Start: 2024-07-18 | End: 2024-07-18 | Stop reason: HOSPADM

## 2024-07-18 RX ORDER — ACETAMINOPHEN 325 MG/1
975 TABLET ORAL ONCE
Status: COMPLETED | OUTPATIENT
Start: 2024-07-18 | End: 2024-07-18

## 2024-07-18 RX ADMIN — PHENYLEPHRINE HYDROCHLORIDE 100 MCG: 10 INJECTION INTRAVENOUS at 07:28

## 2024-07-18 RX ADMIN — PHENYLEPHRINE HYDROCHLORIDE 100 MCG: 10 INJECTION INTRAVENOUS at 07:55

## 2024-07-18 RX ADMIN — LIDOCAINE HYDROCHLORIDE 100 MG: 20 INJECTION, SOLUTION INFILTRATION; PERINEURAL at 07:26

## 2024-07-18 RX ADMIN — ACETAMINOPHEN 975 MG: 325 TABLET, FILM COATED ORAL at 06:08

## 2024-07-18 RX ADMIN — FENTANYL CITRATE 50 MCG: 50 INJECTION INTRAMUSCULAR; INTRAVENOUS at 07:26

## 2024-07-18 RX ADMIN — PHENYLEPHRINE HYDROCHLORIDE 200 MCG: 10 INJECTION INTRAVENOUS at 07:43

## 2024-07-18 RX ADMIN — PHENYLEPHRINE HYDROCHLORIDE 200 MCG: 10 INJECTION INTRAVENOUS at 07:48

## 2024-07-18 RX ADMIN — FENTANYL CITRATE 25 MCG: 50 INJECTION, SOLUTION INTRAMUSCULAR; INTRAVENOUS at 09:09

## 2024-07-18 RX ADMIN — FENTANYL CITRATE 50 MCG: 50 INJECTION, SOLUTION INTRAMUSCULAR; INTRAVENOUS at 08:34

## 2024-07-18 RX ADMIN — SODIUM CHLORIDE, POTASSIUM CHLORIDE, SODIUM LACTATE AND CALCIUM CHLORIDE: 600; 310; 30; 20 INJECTION, SOLUTION INTRAVENOUS at 07:18

## 2024-07-18 RX ADMIN — PHENYLEPHRINE HYDROCHLORIDE 100 MCG: 10 INJECTION INTRAVENOUS at 07:39

## 2024-07-18 RX ADMIN — Medication 2 G: at 07:18

## 2024-07-18 RX ADMIN — MIDAZOLAM 2 MG: 1 INJECTION INTRAMUSCULAR; INTRAVENOUS at 07:19

## 2024-07-18 RX ADMIN — HYDROCODONE BITARTRATE AND ACETAMINOPHEN 1 TABLET: 5; 325 TABLET ORAL at 09:18

## 2024-07-18 RX ADMIN — PROPOFOL 250 MG: 10 INJECTION, EMULSION INTRAVENOUS at 07:26

## 2024-07-18 RX ADMIN — ONDANSETRON 4 MG: 2 INJECTION INTRAMUSCULAR; INTRAVENOUS at 07:38

## 2024-07-18 RX ADMIN — DEXAMETHASONE SODIUM PHOSPHATE 4 MG: 4 INJECTION, SOLUTION INTRA-ARTICULAR; INTRALESIONAL; INTRAMUSCULAR; INTRAVENOUS; SOFT TISSUE at 07:33

## 2024-07-18 RX ADMIN — PHENYLEPHRINE HYDROCHLORIDE 100 MCG: 10 INJECTION INTRAVENOUS at 07:34

## 2024-07-18 RX ADMIN — PHENYLEPHRINE HYDROCHLORIDE 100 MCG: 10 INJECTION INTRAVENOUS at 07:26

## 2024-07-18 ASSESSMENT — ACTIVITIES OF DAILY LIVING (ADL)
ADLS_ACUITY_SCORE: 35

## 2024-07-18 ASSESSMENT — ENCOUNTER SYMPTOMS
SEIZURES: 0
DYSRHYTHMIAS: 0

## 2024-07-18 ASSESSMENT — LIFESTYLE VARIABLES: TOBACCO_USE: 0

## 2024-07-18 NOTE — BRIEF OP NOTE
Vaibhav Vaca  MRN [id number]  [facility] [id number]  7/18/2024    Urology brief op note  Preop diagnosis-left renal stones  Postop diagnosis the same  Procedure-left ESWL  Anesthesia-General  Surgeon-ELOY  Estimated blood loss-none  Summary of findings-8 mm left upper pole stone well fragmented, total shocks 1200

## 2024-07-18 NOTE — OP NOTE
Vaibhav Vaca  MRN [id number]  [facility] [id number]  7/18/2024    Urology op note  Preop diagnosis-left renal stone  Postop diagnosis-the same  Procedure-left shockwave lithotripsy  Anesthesia-General  Surgeon -saira  Estimated blood loss-none  Findings-8 mm upper pole stone well fragmented, total shocks 1200    Brief history and physical-58-year-old male with a history of intermittent left flank pain.  CT scan demonstrated the presence of an 8 mm upper pole stone and several smaller stones.  KUB confirmed findings.  Patient has continued to have discomfort and has elected to undergo treatment.  Procedure 30 Splane to the patient including possible risk complications and need for further treatments.  All questions were answered to his satisfaction.    Description of the procedure-proper permits were obtained and signed.  The left side was marked..  After adequate general anesthesia, he was positioned on the Storz lithotripsy table.  The stone was well-visualized in in the X, Y, and Z axis.  He underwent a total of 1200 shocks with very good fragmentation.  No other significant stones were visualized.  He was taken to recovery in stable condition.      JOSE ROBERTO LYONS M.D.

## 2024-07-18 NOTE — DISCHARGE INSTRUCTIONS
Today you were given 975 mg of Tylenol at 6:15 am. The recommended daily maximum dose is 4000 mg.     **Because you had anesthesia today and your history of sleep apnea, it is extremely important that you use your CPAP machine for the next 24 hours while napping or sleeping.**       Narcotic (HYDROcodone-acetaminophen (NORCO) 5-325 MG per tablet 1 tablet) given at 9:15 am.       DISCHARGE INSTRUCTIONS FOLLOWING EXTRACORPOREAL SHOCK LITHOTRIPSY (ESWL)      Your stone(s) has been fragmented into many tiny pieces, which must now pass in your urine.  Usually this process is uneventful.  Most fragments pass in the first one or two week, but some may continue to pass for three months or more.  Some pain or discomfort may accompany the passage of these fragments.    To aid in the passage of fragments, drink lots of fluid.  Aim for 1 glass an hour for the next 1 to 2 weeks (2 quarts or more a day).  Most stone patients will benefit from a continued high fluid intake and urine output indefinitely, even after the fragments are gone.  This helps prevent new stone formation.    Strain your urine.  Take the stone fragments to your urologist.  They will have them analyzed to help determine the cause of your stone(s).    You should walk around and resume every day activities.  Activity may help the stone fragments pass.  You should, however, avoid sports or really strenuous exercise for about a week, or at least until there is no more blood in your urine.    You may resume regular diet.    Call your urologist if you have:  a. Persistent severe pain not relieved by oral medications.  b. Fever (over 101 ).  c. Persistent vomiting.    See your urologist as directed.  They will need to take an x-ray to check your progress.  Take your stone fragments to your follow-up appointment.    Same Day Surgery Discharge Instructions for  Sedation and General Anesthesia     It's not unusual to feel dizzy, light-headed or faint for up to 24 hours  after surgery or while taking pain medication.  If you have these symptoms: sit for a few minutes before standing and have someone assist you when you get up to walk or use the bathroom.    You should rest and relax for the next 24 hours. We recommend you make arrangements to have an adult stay with you for at least 24 hours after your discharge.  Avoid hazardous and strenuous activity.    DO NOT DRIVE any vehicle or operate mechanical equipment for 24 hours following the end of your surgery.  Even though you may feel normal, your reactions may be affected by the medication you have received.    Do not drink alcoholic beverages for 24 hours following surgery.     Slowly progress to your regular diet as you feel able. It's not unusual to feel nauseated and/or vomit after receiving anesthesia.  If you develop these symptoms, drink clear liquids (apple juice, ginger ale, broth, 7-up, etc. ) until you feel better.  If your nausea and vomiting persists for 24 hours, please notify your surgeon.      All narcotic pain medications, along with inactivity and anesthesia, can cause constipation. Drinking plenty of liquids and increasing fiber intake will help.    For any questions of a medical nature, call your surgeon.    Do not make important decisions for 24 hours.    If you had general anesthesia, you may have a sore throat for a couple of days related to the breathing tube used during surgery.  You may use Cepacol lozenges to help with this discomfort.  If it worsens or if you develop a fever, contact your surgeon.     If you feel your pain is not well managed with the pain medications prescribed by your surgeon, please contact your surgeon's office to let them know so they can address your concerns.      **If you have questions or concerns about your procedure,  call Dr. Chambers at [phone]**

## 2024-07-18 NOTE — ANESTHESIA CARE TRANSFER NOTE
Patient: Vaibhav Vaca    Procedure: Procedure(s):  LEFT EXTRACORPOREAL SHOCKWAVE LITHOTRIPSY       Diagnosis: Calculus of kidney [N20.0]  Diagnosis Additional Information: No value filed.    Anesthesia Type:   No value filed.     Note:    Oropharynx: oropharynx clear of all foreign objects and spontaneously breathing  Level of Consciousness: awake and drowsy  Oxygen Supplementation: nasal cannula  Level of Supplemental Oxygen (L/min / FiO2): 3  Independent Airway: airway patency satisfactory and stable  Dentition: dentition unchanged  Vital Signs Stable: post-procedure vital signs reviewed and stable  Report to RN Given: handoff report given  Patient transferred to: PACU    Handoff Report: Identifed the Patient, Identified the Reponsible Provider, Reviewed the pertinent medical history, Discussed the surgical course, Reviewed Intra-OP anesthesia mangement and issues during anesthesia, Set expectations for post-procedure period and Allowed opportunity for questions and acknowledgement of understanding      Vitals:  Vitals Value Taken Time   BP     Temp     Pulse     Resp     SpO2         Electronically Signed By: ESTELITA Gutierres CRNA  July 18, 2024  8:03 AM

## 2024-07-18 NOTE — ANESTHESIA PREPROCEDURE EVALUATION
Anesthesia Pre-Procedure Evaluation    Patient: Vaibhav Vaca   MRN: [id number] : 1965        Procedure : Procedure(s):  LEFT EXTRACORPOREAL SHOCKWAVE LITHOTRIPSY          Past Medical History:   Diagnosis Date    Essential hypertension 2014    GERD (gastroesophageal reflux disease) 05/15/2017    History of kidney stones 2017    Hypercholesteremia     Hyperlipemia     Morbid obesity (H)     CHANTELLE (obstructive sleep apnea) 03/10/2015    uses CPAP    Vitamin D deficiency 2014      Past Surgical History:   Procedure Laterality Date    CHOLECYSTECTOMY, LAPOROSCOPIC      DAVINCI BYPASS GASTRIC DENNISE-EN-Y N/A 2021    Procedure: ROBOTIC ASSISTED LAPAROSCOPIC REVISION TO GASTRIC BYPASS;  Surgeon: Michel Shepherd MD;  Location: [facility] OR    GI SURGERY  03/10/2015    sleeve gastrectomy     LAPAROSCOPIC GASTRIC SLEEVE        Allergies   Allergen Reactions    Nsaids      Gastric bypass      Social History     Tobacco Use    Smoking status: Former     Types: Cigarettes     Passive exposure: Past    Smokeless tobacco: Never    Tobacco comments:     Smoked for 6 years while in high school- 1/2 PPD   Substance Use Topics    Alcohol use: Not Currently      Wt Readings from Last 1 Encounters:   23 112.2 kg (247 lb 4.8 oz)          Prior to Admission medications    Medication Sig Start Date End Date Taking? Authorizing Provider   CALCIUM CITRATE-VITAMIN D PO    Yes Reported, Patient   cyanocobalamin (VITAMIN B-12) 1000 MCG tablet Take 1,000 mcg by mouth once a week   Yes Reported, Patient   Iron-Vitamins (FEMIRON MULTIVITAMIN/IRON OR)    Yes Reported, Patient   Semaglutide-Weight Management (WEGOVY) 1.7 MG/0.75ML pen Inject 1.7 mg subcutaneously once a week   Yes Reported, Patient   bisacodyl (DULCOLAX) 5 MG EC tablet 2 days prior to procedure, take 2 tablets at 4 pm. 1 day prior to procedure, take 2 tablets at 4 pm. For additional instructions refer to your colonoscopy prep instructions.  6/7/23   Neri Arreaga MD   hydrochlorothiazide (HYDRODIURIL) 25 MG tablet Take 1 tablet (25 mg) by mouth every morning 4/11/23   Mary Koroma MD   omeprazole (PRILOSEC) 20 MG DR capsule Take 1 capsule (20 mg) by mouth every morning (before breakfast) 4/11/23   Mary Koroma MD   polyethylene glycol (GOLYTELY) 236 g suspension 2 days prior at 5pm, mix and drink half of a jug of Golytely. Drink an 8 oz. glass of Golytely every 15 minutes until half of the jug is gone. Place remainder of Golytely in the refrigerator. 1 day prior at 5 pm, drink the 2nd half of a jug of Golytely bowel prep. 6 hours before your check-in time, drink an 8 oz. glass of Golytely every 15 minutes until half of the 2nd jug of Golytely is gone. Discard remainder of second jug. 6/7/23   Neri Arreaga MD     ECG 6/26/24: SR    Anesthesia Evaluation   Pt has had prior anesthetic. Type: General.    No history of anesthetic complications       ROS/MED HX  ENT/Pulmonary:     (+) sleep apnea,                                    (-) tobacco use and asthma   Neurologic:    (-) no seizures, no CVA and migraines   Cardiovascular:     (+) Dyslipidemia hypertension- -   -  - -                                   (-) CAD, MARY, arrhythmias, valvular problems/murmurs and murmur   METS/Exercise Tolerance:     Hematologic:    (-) history of blood clots and anemia   Musculoskeletal:    (-) arthritis   GI/Hepatic: Comment: Hx gastric bypass    (+) GERD,                (-) liver disease   Renal/Genitourinary:     (+)       Nephrolithiasis ,    (-) renal disease   Endo:     (+)               Obesity,    (-) Type I DM, Type II DM and thyroid disease   Psychiatric/Substance Use:    (-) psychiatric history   Infectious Disease:    (-) Recent Fever   Malignancy:       Other:            Physical Exam    Airway        Mallampati: II   TM distance: > 3 FB   Neck ROM: full   Mouth opening: > 3 cm    Respiratory Devices and Support         Dental     "   (+) Completely normal teeth      Cardiovascular   cardiovascular exam normal       Rhythm and rate: regular and normal (-) no murmur    Pulmonary   pulmonary exam normal        breath sounds clear to auscultation           OUTSIDE LABS:  CBC:   Lab Results   Component Value Date    WBC 5.5 04/11/2023    HGB 13.9 04/11/2023    HGB 12.7 (L) 07/29/2021    HCT 42.8 04/11/2023     04/11/2023     07/28/2021     BMP:   Lab Results   Component Value Date     04/11/2023     07/29/2021    POTASSIUM 4.0 04/11/2023    POTASSIUM 3.8 07/29/2021    CHLORIDE 108 04/11/2023    CHLORIDE 105 07/29/2021    CO2 29 04/11/2023    CO2 31 07/29/2021    BUN 13 04/11/2023    BUN 18 06/29/2021    CR 0.97 04/11/2023    CR 1.25 07/28/2021    GLC 88 04/11/2023    GLC 94 07/29/2021     COAGS: No results found for: \"PTT\", \"INR\", \"FIBR\"  POC: No results found for: \"BGM\", \"HCG\", \"HCGS\"  HEPATIC:   Lab Results   Component Value Date    ALBUMIN 3.6 04/11/2023    PROTTOTAL 7.5 04/11/2023    ALT 27 04/11/2023    AST 20 04/11/2023    ALKPHOS 120 04/11/2023    BILITOTAL 0.7 04/11/2023     OTHER:   Lab Results   Component Value Date    A1C 5.7 (H) 04/11/2023    MONA 9.3 04/11/2023    TSH 0.88 04/11/2023       Anesthesia Plan    ASA Status:  2    NPO Status:  NPO Appropriate    Anesthesia Type: General.     - Airway: LMA   Induction: Propofol.   Maintenance: Balanced.        Consents    Anesthesia Plan(s) and associated risks, benefits, and realistic alternatives discussed. Questions answered and patient/representative(s) expressed understanding.     - Discussed:     - Discussed with:  Patient            Postoperative Care    Pain management: Multi-modal analgesia.   PONV prophylaxis: Ondansetron (or other 5HT-3), Dexamethasone or Solumedrol, Background Propofol Infusion     Comments:               Dena Short MD    I have reviewed the pertinent notes and labs in the chart from the past 30 days and (re)examined the patient.  " Any updates or changes from those notes are reflected in this note.

## 2024-07-18 NOTE — OR NURSING
AOX3-VSS-O2 sats >92% RA- Good PO intake, Denies c/o- Pt and responsible adult verbalize understanding of discharge instructions, denies questions. Up in W/C - transported to door for discharge to home.

## 2024-07-18 NOTE — ANESTHESIA POSTPROCEDURE EVALUATION
Patient: Vaibhav Vaca    Procedure: Procedure(s):  LEFT EXTRACORPOREAL SHOCKWAVE LITHOTRIPSY       Anesthesia Type:  No value filed.    Note:  Disposition: Outpatient   Postop Pain Control: Uneventful            Sign Out: Well controlled pain   PONV: No   Neuro/Psych: Uneventful            Sign Out: Acceptable/Baseline neuro status   Airway/Respiratory: Uneventful            Sign Out: Acceptable/Baseline resp. status   CV/Hemodynamics: Uneventful            Sign Out: Acceptable CV status; No obvious hypovolemia; No obvious fluid overload   Other NRE: NONE   DID A NON-ROUTINE EVENT OCCUR? No           Last vitals:  Vitals Value Taken Time   /77 07/18/24 0925   Temp 36.1  C (97  F) 07/18/24 0925   Pulse 65 07/18/24 0925   Resp 14 07/18/24 0925   SpO2 96 % 07/18/24 0925       Electronically Signed By: Dena Short MD  July 18, 2024  12:15 PM

## 2024-07-18 NOTE — ANESTHESIA PROCEDURE NOTES
Airway       Patient location during procedure: OR  Staff -        Anesthesiologist:  Dena Short MD       CRNA: Priscila Bailey APRN CRNA       Performed By: CRNA  Consent for Airway        Urgency: elective  Indications and Patient Condition       Indications for airway management: david-procedural       Induction type:intravenous       Mask difficulty assessment: 1 - vent by mask    Final Airway Details       Final airway type: supraglottic airway    Supraglottic Airway Details        Type: LMA       Brand: I-Gel       LMA size: 5    Post intubation assessment        Placement verified by: capnometry, equal breath sounds and chest rise        Number of attempts at approach: 1       Number of other approaches attempted: 0       Ease of procedure: easy       Dentition: Intact and Unchanged

## 2025-05-17 ENCOUNTER — HEALTH MAINTENANCE LETTER (OUTPATIENT)
Age: 60
End: 2025-05-17

## (undated) DEVICE — SOL NACL 0.9% INJ 1000ML BAG 2B1324X

## (undated) DEVICE — DAVINCI XI DRAPE ARM 470015

## (undated) DEVICE — LIGHT HANDLE X2

## (undated) DEVICE — PREP CHLORAPREP 26ML TINTED ORANGE  260815

## (undated) DEVICE — DECANTER BAG 2002S

## (undated) DEVICE — ENDO TROCAR FIRST ENTRY KII FIOS Z-THRD 05X100MM CTF03

## (undated) DEVICE — STPL DAVINCI SUREFORM 60MM RELOAD BLUE 48360B

## (undated) DEVICE — GASTRECTOMY SLEEVE STABILIZATION SYSTEM VISIGI 3D 36FR 5236B

## (undated) DEVICE — SU VICRYL 0 UR-6 27" J603H

## (undated) DEVICE — GOWN IMPERVIOUS SPECIALTY XLG/XLONG 32474

## (undated) DEVICE — DAVINCI XI DRAPE COLUMN 470341

## (undated) DEVICE — DAVINCI SEAL CANNULA AND STPL 12MM 470380

## (undated) DEVICE — DRSG BANDAID 1X3" FABRIC CURITY LATEX FREE KC44101

## (undated) DEVICE — ESU GROUND PAD UNIVERSAL W/O CORD

## (undated) DEVICE — STPL DAVINCI SUREFORM 60MM RELOAD GREEN 48360G

## (undated) DEVICE — SOL WATER IRRIG 1000ML BOTTLE 2F7114

## (undated) DEVICE — DAVINCI XI HANDPIECE ESU VESSEL SEALER 8MM EXT 480422

## (undated) DEVICE — GLOVE PROTEXIS W/NEU-THERA 8.0  2D73TE80

## (undated) DEVICE — PACK LAP CHOLE SLC15LCFSD

## (undated) DEVICE — DAVINCI XI SEAL UNIVERSAL 5-8MM 470361

## (undated) DEVICE — ENDO TROCAR FIRST ENTRY KII FIOS Z-THRD 11X100MM CTF33

## (undated) DEVICE — EVAC SYSTEM CLEAR FLOW SC082500

## (undated) DEVICE — DAVINCI XI OBTURATOR BLADELESS 8MM 470359

## (undated) DEVICE — SYSTEM CLEARIFY VISUALIZATION 21-345

## (undated) DEVICE — DAVINCI XI NDL DRIVER LARGE 470006

## (undated) DEVICE — SOL NACL 0.9% IRRIG 1000ML BOTTLE 2F7124

## (undated) DEVICE — SU VICRYL 4-0 PS-2 18" UND J496H

## (undated) DEVICE — Device

## (undated) DEVICE — LUBRICANT INST ELECTROLUBE EL101

## (undated) DEVICE — DRAPE BREAST/CHEST 29420

## (undated) DEVICE — STPL DAVINCI SUREFORM 60MM STR 480460

## (undated) DEVICE — SU STRATAFIX PDS PLUS 2-0 SPIRAL VIOLET SPXX1B415

## (undated) DEVICE — DAVINCI XI REDUCER 8-12MM 470381

## (undated) DEVICE — DAVINCI XI FCP CADIERE 470049

## (undated) DEVICE — SYR 50ML CATH TIP W/O NDL 309620

## (undated) DEVICE — LINEN TOWEL PACK X5 5464

## (undated) DEVICE — SUCTION CANISTER MEDIVAC LINER 3000ML W/LID 65651-530

## (undated) DEVICE — DAVINCI HOT SHEARS TIP COVER  400180

## (undated) DEVICE — SUCTION IRR STRYKERFLOW II W/TIP 250-070-520

## (undated) DEVICE — DRAPE SHEET REV FOLD 3/4 9349

## (undated) RX ORDER — PROPOFOL 10 MG/ML
INJECTION, EMULSION INTRAVENOUS
Status: DISPENSED
Start: 2024-07-18

## (undated) RX ORDER — DEXMEDETOMIDINE HYDROCHLORIDE 4 UG/ML
INJECTION, SOLUTION INTRAVENOUS
Status: DISPENSED
Start: 2021-07-28

## (undated) RX ORDER — KETOROLAC TROMETHAMINE 30 MG/ML
INJECTION, SOLUTION INTRAMUSCULAR; INTRAVENOUS
Status: DISPENSED
Start: 2021-07-28

## (undated) RX ORDER — DEXAMETHASONE SODIUM PHOSPHATE 4 MG/ML
INJECTION, SOLUTION INTRA-ARTICULAR; INTRALESIONAL; INTRAMUSCULAR; INTRAVENOUS; SOFT TISSUE
Status: DISPENSED
Start: 2024-07-18

## (undated) RX ORDER — PROPOFOL 10 MG/ML
INJECTION, EMULSION INTRAVENOUS
Status: DISPENSED
Start: 2021-07-28

## (undated) RX ORDER — LIDOCAINE HYDROCHLORIDE 20 MG/ML
INJECTION, SOLUTION EPIDURAL; INFILTRATION; INTRACAUDAL; PERINEURAL
Status: DISPENSED
Start: 2021-07-28

## (undated) RX ORDER — ONDANSETRON 2 MG/ML
INJECTION INTRAMUSCULAR; INTRAVENOUS
Status: DISPENSED
Start: 2021-07-28

## (undated) RX ORDER — CEFAZOLIN SODIUM/WATER 2 G/20 ML
SYRINGE (ML) INTRAVENOUS
Status: DISPENSED
Start: 2024-07-18

## (undated) RX ORDER — BUPIVACAINE HYDROCHLORIDE AND EPINEPHRINE 2.5; 5 MG/ML; UG/ML
INJECTION, SOLUTION EPIDURAL; INFILTRATION; INTRACAUDAL; PERINEURAL
Status: DISPENSED
Start: 2021-07-28

## (undated) RX ORDER — FENTANYL CITRATE 0.05 MG/ML
INJECTION, SOLUTION INTRAMUSCULAR; INTRAVENOUS
Status: DISPENSED
Start: 2024-07-18

## (undated) RX ORDER — ONDANSETRON 2 MG/ML
INJECTION INTRAMUSCULAR; INTRAVENOUS
Status: DISPENSED
Start: 2024-07-18

## (undated) RX ORDER — FENTANYL CITRATE 50 UG/ML
INJECTION, SOLUTION INTRAMUSCULAR; INTRAVENOUS
Status: DISPENSED
Start: 2021-07-28

## (undated) RX ORDER — HYDROMORPHONE HYDROCHLORIDE 1 MG/ML
INJECTION, SOLUTION INTRAMUSCULAR; INTRAVENOUS; SUBCUTANEOUS
Status: DISPENSED
Start: 2021-07-28

## (undated) RX ORDER — DEXAMETHASONE SODIUM PHOSPHATE 4 MG/ML
INJECTION, SOLUTION INTRA-ARTICULAR; INTRALESIONAL; INTRAMUSCULAR; INTRAVENOUS; SOFT TISSUE
Status: DISPENSED
Start: 2021-07-28

## (undated) RX ORDER — GLYCOPYRROLATE 0.2 MG/ML
INJECTION, SOLUTION INTRAMUSCULAR; INTRAVENOUS
Status: DISPENSED
Start: 2021-07-28

## (undated) RX ORDER — HYDROCODONE BITARTRATE AND ACETAMINOPHEN 5; 325 MG/1; MG/1
TABLET ORAL
Status: DISPENSED
Start: 2024-07-18

## (undated) RX ORDER — NEOSTIGMINE METHYLSULFATE 1 MG/ML
VIAL (ML) INJECTION
Status: DISPENSED
Start: 2021-07-28

## (undated) RX ORDER — CEFAZOLIN SODIUM IN 0.9 % NACL 3 G/100 ML
INTRAVENOUS SOLUTION, PIGGYBACK (ML) INTRAVENOUS
Status: DISPENSED
Start: 2021-07-28

## (undated) RX ORDER — HEPARIN SODIUM 5000 [USP'U]/.5ML
INJECTION, SOLUTION INTRAVENOUS; SUBCUTANEOUS
Status: DISPENSED
Start: 2021-07-28

## (undated) RX ORDER — ACETAMINOPHEN 325 MG/1
TABLET ORAL
Status: DISPENSED
Start: 2024-07-18

## (undated) RX ORDER — FENTANYL CITRATE 50 UG/ML
INJECTION, SOLUTION INTRAMUSCULAR; INTRAVENOUS
Status: DISPENSED
Start: 2024-07-18

## (undated) RX ORDER — INDOCYANINE GREEN AND WATER 25 MG
KIT INJECTION
Status: DISPENSED
Start: 2021-07-28